# Patient Record
Sex: MALE | Race: WHITE | NOT HISPANIC OR LATINO
[De-identification: names, ages, dates, MRNs, and addresses within clinical notes are randomized per-mention and may not be internally consistent; named-entity substitution may affect disease eponyms.]

---

## 2021-05-27 PROBLEM — Z00.00 ENCOUNTER FOR PREVENTIVE HEALTH EXAMINATION: Status: ACTIVE | Noted: 2021-05-27

## 2021-06-02 PROBLEM — I10 BENIGN HYPERTENSION: Status: ACTIVE | Noted: 2021-06-02

## 2021-06-02 PROBLEM — Z87.891 FORMER SMOKER: Status: ACTIVE | Noted: 2021-06-02

## 2021-06-02 PROBLEM — Z86.79 HISTORY OF AORTIC VALVE STENOSIS: Status: RESOLVED | Noted: 2021-06-02 | Resolved: 2021-06-02

## 2021-06-02 PROBLEM — Z82.49 FAMILY HISTORY OF CARDIAC DISORDER: Status: ACTIVE | Noted: 2021-06-02

## 2021-06-02 PROBLEM — Z78.9 SOCIAL ALCOHOL USE: Status: ACTIVE | Noted: 2021-06-02

## 2021-06-02 PROBLEM — Z87.74 HISTORY OF BICUSPID AORTIC VALVE: Status: RESOLVED | Noted: 2021-06-02 | Resolved: 2021-06-02

## 2021-06-03 ENCOUNTER — APPOINTMENT (OUTPATIENT)
Dept: CARDIOTHORACIC SURGERY | Facility: CLINIC | Age: 71
End: 2021-06-03
Payer: MEDICARE

## 2021-06-03 VITALS
OXYGEN SATURATION: 95 % | RESPIRATION RATE: 16 BRPM | HEIGHT: 71 IN | WEIGHT: 230 LBS | SYSTOLIC BLOOD PRESSURE: 109 MMHG | BODY MASS INDEX: 32.2 KG/M2 | HEART RATE: 61 BPM | TEMPERATURE: 97.6 F | DIASTOLIC BLOOD PRESSURE: 71 MMHG

## 2021-06-03 DIAGNOSIS — Z87.74 PERSONAL HISTORY OF (CORRECTED) CONGENITAL MALFORMATIONS OF HEART AND CIRCULATORY SYSTEM: ICD-10-CM

## 2021-06-03 DIAGNOSIS — Z87.891 PERSONAL HISTORY OF NICOTINE DEPENDENCE: ICD-10-CM

## 2021-06-03 DIAGNOSIS — Z82.49 FAMILY HISTORY OF ISCHEMIC HEART DISEASE AND OTHER DISEASES OF THE CIRCULATORY SYSTEM: ICD-10-CM

## 2021-06-03 DIAGNOSIS — Z78.9 OTHER SPECIFIED HEALTH STATUS: ICD-10-CM

## 2021-06-03 DIAGNOSIS — I10 ESSENTIAL (PRIMARY) HYPERTENSION: ICD-10-CM

## 2021-06-03 DIAGNOSIS — Z86.79 PERSONAL HISTORY OF OTHER DISEASES OF THE CIRCULATORY SYSTEM: ICD-10-CM

## 2021-06-03 PROCEDURE — 99203 OFFICE O/P NEW LOW 30 MIN: CPT

## 2021-06-03 RX ORDER — ASPIRIN 325 MG/1
325 TABLET, FILM COATED ORAL DAILY
Qty: 30 | Refills: 0 | Status: COMPLETED | COMMUNITY
End: 2021-06-03

## 2021-06-14 NOTE — END OF VISIT
[FreeTextEntry3] : \par I personally scribed for YUNG MAYBERRY on Ihsan  3 2021  1:30PM . \par \par \par \par \par Physician Attestation:\par Documented by JOSE PEDERSEN acting as a scribe for YUNG MAYBERRY 06/03/2021 . \par                 All medical record entries made by the Scribe were at my, YUNG MAYBERRY , direction and personally dictated by me on 06/03/2021 . I have reviewed the chart and agree that the record accurately reflects my personal performance of the history, physical exam, assessment and plan\par \par

## 2021-06-14 NOTE — ASSESSMENT
[FreeTextEntry1] : This is a 70 year old male with past medical PAF ( Eliquis) S/P Cardioversion 2 weeks ago but failed ,Hypertension, Aortic stenosis, Bicuspid aortic valve S/P Bio AVR  using 27 mm bovine pericardial aortic valve on 8/2009 with  in [facility] , SSS s/p PPM in 2009 and  revision 2 yrs ago with complaints of palpitations, Dyspnea on exertion, Fatigue for 3 weeks . He reports that he doesn't have much shortness of breath but more fatigue with activities. He went for pacemaker interrogation 3 weeks ago and was found to have 75 % AFib burden, started on Eliquis  and had cardioversion 2 weeks ago but failed. He had a ALYSON on 5/26/21 during the cardioversion which revealed LVEF 55 to 60%, A bio prosthetic aortic valve is present in the aortic position. Severe bio prosthetic aortic stenosis with a PGR 94 mm hg and MGR 64 mm hg, mild aortic regurgitation. Moderate to severe Mitral regurgitation, directed centrally. Mild tricuspid regurgitation. He also reports that he still has hoarseness of voice from ALYSON. He can walk up to 1 mile and 2 FOS without shortness of breath. He uses 1 pillow to sleep. He is here for initial evaluation and management for Bio prosthetic aortic stenosis and Mitral regurgitation. Denies any chest pain, dizziness, syncope or pedal edema. \par \par \par   reviewed the cardiac imaging, medical records and reports with patient and discussed the case. 5/26/21 ALYSON revealed LVEF 55 to 60%, A bio prosthetic aortic valve is present in the aortic position. Severe bio prosthetic aortic stenosis with a PGR 94 mm hg and MGR 64 mm hg, mild aortic regurgitation. Moderate to severe Mitral regurgitation, directed centrally. Mild tricuspid regurgitation. \par \par   discussed the risks , benefits and alternatives to surgery. Risks included but not limited to  bleeding , stroke, Myocardial Infarction, kidney problems,Blood transfusion ,permanent  pacemaker implantation,  infections and death.   quoted a low operative mortality and complication risks.   also discussed the various approaches in detail ( TAVR Vs AVR) . will make final recommendations after discussing with structural heart team. All questions and concerns were addressed and patient agrees to proceed with the plan.\par \par \par Plan:\par 1) TAVR Vs AVR \par 2) Cardiac Catherization to evaluate coronary arteries\par 3) Stop Eliquis 3 days before scheduled surgery date.\par \par ADDENDUM--Discussed with Dr. Workman.  Will have valve in valve TAVR evaluation.\par

## 2021-06-14 NOTE — DATA REVIEWED
[FreeTextEntry1] : 5/26/21 ALYSON revealed LVEF 55 to 60%, A bio prosthetic aortic valve is present in the aortic position. Severe bio prosthetic aortic stenosis with a PGR 94 mm hg and MGR 64 mm hg, mild aortic regurgitation. Moderate to severe Mitral regurgitation, directed centrally. Mild tricuspid regurgitation.

## 2021-06-14 NOTE — HISTORY OF PRESENT ILLNESS
[FreeTextEntry1] : This is a 70 year old male with past medical PAF ( Eliquis) S/P Cardioversion 2 weeks ago but failed ,Hypertension, Aortic stenosis, Bicuspid aortic valve S/P Bio AVR  using 27 mm bovine pericardial aortic valve on 8/2009 with  in [facility] , SSS s/p PPM in 2009 and  revision 2 yrs ago with complaints of palpitations, Dyspnea on exertion, Fatigue for 3 weeks . He reports that he doesn't have much shortness of breath but more fatigue with activities. He went for pacemaker interrogation 3 weeks ago and was found to have 75 % AFib burden, started on Eliquis  and had cardioversion 2 weeks ago but failed. He had a ALYSON on 5/26/21 during the cardioversion which revealed LVEF 55 to 60%, A bio prosthetic aortic valve is present in the aortic position. Severe bio prosthetic aortic stenosis with a PGR 94 mm hg and MGR 64 mm hg, mild aortic regurgitation. Moderate to severe Mitral regurgitation, directed centrally. Mild tricuspid regurgitation. He also reports that he still has hoarseness of voice from ALYSON. He can walk up to 1 mile and 2 FOS without shortness of breath. He uses 1 pillow to sleep. He is here for initial evaluation and management for Bio prosthetic aortic stenosis and Mitral regurgitation. Denies any chest pain, dizziness, syncope or pedal edema.

## 2021-06-14 NOTE — CONSULT LETTER
[Dear  ___] : Dear  [unfilled], [FreeTextEntry2] :   Jose Alberto Workman M.D.\par [address] [place] [address]\par [place] [id number] [FreeTextEntry1] : I had the pleasure of seeing your patient, SADE JAMES,in my office today. \par \par We take a multidisciplinary team approach to patient care and consider you, the referring physician, an extension of our team. We will maintain an open line of communication with you throughout your patient's treatment course. \par \par He  is being evaluated for  Bio prosthetic aortic stenosis and Mitral regurgitation . I have reviewed all of the patient's medical records and diagnostic images at the time of his  office consultation. I have enclosed a copy for your records. \par \par 5/26/21 ALYSON revealed LVEF 55 to 60%, A bio prosthetic aortic valve is present in the aortic position. Severe bio prosthetic aortic stenosis with a PGR 94 mm hg and MGR 64 mm hg, mild aortic regurgitation. Moderate to severe Mitral regurgitation, directed centrally. Mild tricuspid regurgitation. \par \par I have reviewed the indications for surgery and anatomy of the heart. The patient meets criteria for surgery. I have discussed TAVR vs AVR . I will make final recommendations after the discussing with structural heart team. \par \par I appreciate the opportunity to care for your patient at the  [facility] based at [place]. If there are any questions or concerns, please call me  at [phone].\par \par Please see my note below. \par \par Sincerely, \par \par \par \par \par Jaron Orlando MD\par Director\par [facility]\par Professor \par Cardiovascular & Thoracic Surgery\par [facility] [employer].\par \par \par [facility]:\par Department of Cardiovascular and Thoracic Surgery\[address], [address]\par Office: [phone]\par Fax: [phone]\par \par [facility]\par  \par Department of Cardiovascular and Thoracic Surgery\[address], [address]\par Phone: [phone]\par Office: [phone]\par \par \par \par \par \par

## 2021-06-14 NOTE — PHYSICAL EXAM
[General Appearance - Alert] : alert [General Appearance - In No Acute Distress] : in no acute distress [General Appearance - Well Nourished] : well nourished [General Appearance - Well Developed] : well developed [Sclera] : the sclera and conjunctiva were normal [PERRL With Normal Accommodation] : pupils were equal in size, round, and reactive to light [Outer Ear] : the ears and nose were normal in appearance [Hearing Threshold Finger Rub Not Baca] : hearing was normal [Both Tympanic Membranes Were Examined] : both tympanic membranes were normal [Neck Appearance] : the appearance of the neck was normal [] : no respiratory distress [Respiration, Rhythm And Depth] : normal respiratory rhythm and effort [Apical Impulse] : the apical impulse was normal [Heart Rate And Rhythm] : heart rate was normal and rhythm regular [Systolic grade ___/6] : A grade [unfilled]/6 systolic murmur was heard. [Examination Of The Chest] : the chest was normal in appearance [2+] : left 2+ [Breast Appearance] : normal in appearance [Bowel Sounds] : normal bowel sounds [Abdomen Soft] : soft [No CVA Tenderness] : no ~M costovertebral angle tenderness [Abnormal Walk] : normal gait [Involuntary Movements] : no involuntary movements were seen [Skin Color & Pigmentation] : normal skin color and pigmentation [Skin Turgor] : normal skin turgor [No Focal Deficits] : no focal deficits [Oriented To Time, Place, And Person] : oriented to person, place, and time [Impaired Insight] : insight and judgment were intact [Affect] : the affect was normal [Mood] : the mood was normal [Memory Recent] : recent memory was not impaired [Memory Remote] : remote memory was not impaired [FreeTextEntry1] : Deferred

## 2021-06-14 NOTE — REVIEW OF SYSTEMS
[Feeling Tired] : feeling tired [Palpitations] : palpitations [SOB on Exertion] : shortness of breath during exertion [Negative] : Heme/Lymph [Chest Pain] : no chest pain [Lower Ext Edema] : no extremity edema [Dizziness] : no dizziness [Fainting] : no fainting [Easy Bleeding] : no tendency for easy bleeding [Easy Bruising] : no tendency for easy bruising

## 2021-06-14 NOTE — REASON FOR VISIT
[Initial Evaluation] : an initial evaluation [FreeTextEntry1] :  Bio prosthetic aortic stenosis and Mitral regurgitation.

## 2021-06-22 ENCOUNTER — APPOINTMENT (OUTPATIENT)
Dept: CARDIOTHORACIC SURGERY | Facility: CLINIC | Age: 71
End: 2021-06-22
Payer: MEDICARE

## 2021-06-22 VITALS
SYSTOLIC BLOOD PRESSURE: 109 MMHG | HEART RATE: 66 BPM | BODY MASS INDEX: 31.92 KG/M2 | RESPIRATION RATE: 16 BRPM | HEIGHT: 71 IN | DIASTOLIC BLOOD PRESSURE: 74 MMHG | WEIGHT: 228 LBS | TEMPERATURE: 97.6 F | OXYGEN SATURATION: 96 %

## 2021-06-22 DIAGNOSIS — Z95.2 PRESENCE OF PROSTHETIC HEART VALVE: ICD-10-CM

## 2021-06-22 DIAGNOSIS — T82.857A STENOSIS OF OTHER CARDIAC PROSTHETIC, INITIAL ENCOUNTER: ICD-10-CM

## 2021-06-22 DIAGNOSIS — I34.0 NONRHEUMATIC MITRAL (VALVE) INSUFFICIENCY: ICD-10-CM

## 2021-06-22 DIAGNOSIS — Z63.5 DISRUPTION OF FAMILY BY SEPARATION AND DIVORCE: ICD-10-CM

## 2021-06-22 LAB
BASOPHILS # BLD AUTO: 0.07 K/UL
BASOPHILS NFR BLD AUTO: 0.5 %
EOSINOPHIL # BLD AUTO: 0.29 K/UL
EOSINOPHIL NFR BLD AUTO: 2.2 %
HCT VFR BLD CALC: 45.4 %
HGB BLD-MCNC: 14.7 G/DL
IMM GRANULOCYTES NFR BLD AUTO: 0.2 %
LYMPHOCYTES # BLD AUTO: 1.98 K/UL
LYMPHOCYTES NFR BLD AUTO: 15.2 %
MAN DIFF?: NORMAL
MCHC RBC-ENTMCNC: 29.6 PG
MCHC RBC-ENTMCNC: 32.4 GM/DL
MCV RBC AUTO: 91.3 FL
MONOCYTES # BLD AUTO: 1.12 K/UL
MONOCYTES NFR BLD AUTO: 8.6 %
NEUTROPHILS # BLD AUTO: 9.56 K/UL
NEUTROPHILS NFR BLD AUTO: 73.3 %
PLATELET # BLD AUTO: 228 K/UL
RBC # BLD: 4.97 M/UL
RBC # FLD: 14.2 %
WBC # FLD AUTO: 13.05 K/UL

## 2021-06-22 PROCEDURE — 93000 ELECTROCARDIOGRAM COMPLETE: CPT

## 2021-06-22 PROCEDURE — 99205 OFFICE O/P NEW HI 60 MIN: CPT

## 2021-06-22 SDOH — SOCIAL STABILITY - SOCIAL INSECURITY: DISRUPTION OF FAMILY BY SEPARATION AND DIVORCE: Z63.5

## 2021-06-23 LAB
ALBUMIN SERPL ELPH-MCNC: 4.6 G/DL
ALP BLD-CCNC: 110 U/L
ALT SERPL-CCNC: 21 U/L
ANION GAP SERPL CALC-SCNC: 17 MMOL/L
AST SERPL-CCNC: 23 U/L
BILIRUB SERPL-MCNC: 1.6 MG/DL
BUN SERPL-MCNC: 27 MG/DL
CALCIUM SERPL-MCNC: 10 MG/DL
CHLORIDE SERPL-SCNC: 100 MMOL/L
CO2 SERPL-SCNC: 20 MMOL/L
CREAT SERPL-MCNC: 1.16 MG/DL
GLUCOSE SERPL-MCNC: 118 MG/DL
NT-PROBNP SERPL-MCNC: 2055 PG/ML
POTASSIUM SERPL-SCNC: 4.8 MMOL/L
PROT SERPL-MCNC: 7.3 G/DL
SODIUM SERPL-SCNC: 137 MMOL/L

## 2021-06-24 ENCOUNTER — RESULT REVIEW (OUTPATIENT)
Age: 71
End: 2021-06-24

## 2021-06-24 ENCOUNTER — OUTPATIENT (OUTPATIENT)
Dept: OUTPATIENT SERVICES | Facility: HOSPITAL | Age: 71
LOS: 1 days | End: 2021-06-24
Payer: MEDICARE

## 2021-06-24 ENCOUNTER — APPOINTMENT (OUTPATIENT)
Dept: CARDIOLOGY | Facility: HOSPITAL | Age: 71
End: 2021-06-24

## 2021-06-24 VITALS
HEART RATE: 58 BPM | HEIGHT: 71 IN | SYSTOLIC BLOOD PRESSURE: 100 MMHG | WEIGHT: 233.91 LBS | OXYGEN SATURATION: 95 % | TEMPERATURE: 98 F | RESPIRATION RATE: 17 BRPM | DIASTOLIC BLOOD PRESSURE: 66 MMHG

## 2021-06-24 DIAGNOSIS — Z29.9 ENCOUNTER FOR PROPHYLACTIC MEASURES, UNSPECIFIED: ICD-10-CM

## 2021-06-24 DIAGNOSIS — Z95.0 PRESENCE OF CARDIAC PACEMAKER: Chronic | ICD-10-CM

## 2021-06-24 DIAGNOSIS — G47.33 OBSTRUCTIVE SLEEP APNEA (ADULT) (PEDIATRIC): ICD-10-CM

## 2021-06-24 DIAGNOSIS — I35.0 NONRHEUMATIC AORTIC (VALVE) STENOSIS: ICD-10-CM

## 2021-06-24 DIAGNOSIS — Z01.818 ENCOUNTER FOR OTHER PREPROCEDURAL EXAMINATION: ICD-10-CM

## 2021-06-24 DIAGNOSIS — Z98.890 OTHER SPECIFIED POSTPROCEDURAL STATES: Chronic | ICD-10-CM

## 2021-06-24 DIAGNOSIS — Z86.79 PERSONAL HISTORY OF OTHER DISEASES OF THE CIRCULATORY SYSTEM: ICD-10-CM

## 2021-06-24 DIAGNOSIS — Z96.651 PRESENCE OF RIGHT ARTIFICIAL KNEE JOINT: Chronic | ICD-10-CM

## 2021-06-24 LAB
A1C WITH ESTIMATED AVERAGE GLUCOSE RESULT: 5.7 % — HIGH (ref 4–5.6)
ANION GAP SERPL CALC-SCNC: 13 MMOL/L — SIGNIFICANT CHANGE UP (ref 5–17)
BLD GP AB SCN SERPL QL: NEGATIVE — SIGNIFICANT CHANGE UP
BUN SERPL-MCNC: 23 MG/DL — SIGNIFICANT CHANGE UP (ref 7–23)
CALCIUM SERPL-MCNC: 9.9 MG/DL — SIGNIFICANT CHANGE UP (ref 8.4–10.5)
CHLORIDE SERPL-SCNC: 100 MMOL/L — SIGNIFICANT CHANGE UP (ref 96–108)
CO2 SERPL-SCNC: 25 MMOL/L — SIGNIFICANT CHANGE UP (ref 22–31)
CREAT SERPL-MCNC: 1.08 MG/DL — SIGNIFICANT CHANGE UP (ref 0.5–1.3)
ESTIMATED AVERAGE GLUCOSE: 117 MG/DL — HIGH (ref 68–114)
GLUCOSE SERPL-MCNC: 93 MG/DL — SIGNIFICANT CHANGE UP (ref 70–99)
HCT VFR BLD CALC: 43 % — SIGNIFICANT CHANGE UP (ref 39–50)
HGB BLD-MCNC: 14.1 G/DL — SIGNIFICANT CHANGE UP (ref 13–17)
MCHC RBC-ENTMCNC: 29.7 PG — SIGNIFICANT CHANGE UP (ref 27–34)
MCHC RBC-ENTMCNC: 32.8 GM/DL — SIGNIFICANT CHANGE UP (ref 32–36)
MCV RBC AUTO: 90.7 FL — SIGNIFICANT CHANGE UP (ref 80–100)
NRBC # BLD: 0 /100 WBCS — SIGNIFICANT CHANGE UP (ref 0–0)
PLATELET # BLD AUTO: 202 K/UL — SIGNIFICANT CHANGE UP (ref 150–400)
POTASSIUM SERPL-MCNC: 4.5 MMOL/L — SIGNIFICANT CHANGE UP (ref 3.5–5.3)
POTASSIUM SERPL-SCNC: 4.5 MMOL/L — SIGNIFICANT CHANGE UP (ref 3.5–5.3)
RBC # BLD: 4.74 M/UL — SIGNIFICANT CHANGE UP (ref 4.2–5.8)
RBC # FLD: 14.2 % — SIGNIFICANT CHANGE UP (ref 10.3–14.5)
RH IG SCN BLD-IMP: POSITIVE — SIGNIFICANT CHANGE UP
SODIUM SERPL-SCNC: 138 MMOL/L — SIGNIFICANT CHANGE UP (ref 135–145)
WBC # BLD: 10.7 K/UL — HIGH (ref 3.8–10.5)
WBC # FLD AUTO: 10.7 K/UL — HIGH (ref 3.8–10.5)

## 2021-06-24 PROCEDURE — 93880 EXTRACRANIAL BILAT STUDY: CPT | Mod: 26

## 2021-06-24 PROCEDURE — G0463: CPT

## 2021-06-24 PROCEDURE — 87640 STAPH A DNA AMP PROBE: CPT

## 2021-06-24 PROCEDURE — 86900 BLOOD TYPING SEROLOGIC ABO: CPT

## 2021-06-24 PROCEDURE — 85027 COMPLETE CBC AUTOMATED: CPT

## 2021-06-24 PROCEDURE — 87641 MR-STAPH DNA AMP PROBE: CPT

## 2021-06-24 PROCEDURE — 71046 X-RAY EXAM CHEST 2 VIEWS: CPT | Mod: 26

## 2021-06-24 PROCEDURE — 86901 BLOOD TYPING SEROLOGIC RH(D): CPT

## 2021-06-24 PROCEDURE — 71046 X-RAY EXAM CHEST 2 VIEWS: CPT

## 2021-06-24 PROCEDURE — 93880 EXTRACRANIAL BILAT STUDY: CPT

## 2021-06-24 PROCEDURE — 83036 HEMOGLOBIN GLYCOSYLATED A1C: CPT

## 2021-06-24 PROCEDURE — 75572 CT HRT W/3D IMAGE: CPT | Mod: 26,MH

## 2021-06-24 PROCEDURE — 75572 CT HRT W/3D IMAGE: CPT

## 2021-06-24 PROCEDURE — 86923 COMPATIBILITY TEST ELECTRIC: CPT

## 2021-06-24 PROCEDURE — 86850 RBC ANTIBODY SCREEN: CPT

## 2021-06-24 PROCEDURE — 80048 BASIC METABOLIC PNL TOTAL CA: CPT

## 2021-06-24 RX ORDER — CEFUROXIME AXETIL 250 MG
1500 TABLET ORAL ONCE
Refills: 0 | Status: DISCONTINUED | OUTPATIENT
Start: 2021-07-01 | End: 2021-07-03

## 2021-06-24 RX ORDER — CHLORHEXIDINE GLUCONATE 213 G/1000ML
1 SOLUTION TOPICAL ONCE
Refills: 0 | Status: DISCONTINUED | OUTPATIENT
Start: 2021-07-01 | End: 2021-07-03

## 2021-06-24 NOTE — H&P PST ADULT - HISTORY OF PRESENT ILLNESS
69 y/o male with history of bicuspid aortic valve, who underwent an AVR and ascending aortic aneurysm repair by Dr. Orlando in 2009.  He was evaluated by Dr. Orlando and recommended to see Dr. Carranza for evaluation for TAVR.  PMHx includes AFib (s/p recent unsuccessful cardioversion on 5/26 at [facility], remains in AFib, started on Eliquis).  ALYSON done.  Angio scheduled to be done at the time of the TAVR.  He complains of ..............Denies angina, orthopnea,     He is scheduled for TAVR on 7/1/21.     He was fully vaccinated for Covid-19, copy of vaccination card on PST chart.   71 y/o male with history of bicuspid aortic valve, who underwent an AVR and ascending aortic aneurysm repair by Dr. Orlando in 2009.  He was evaluated by Dr. Orlando and recommended to see Dr. Carranza for evaluation for TAVR.  PMHx includes AFib (s/p recent unsuccessful cardioversion on 5/26 at [facility], remains in AFib, started on Eliquis).  ALYSON done.  Angio scheduled to be done at the time of the TAVR.  He complains of worsening fatigue, dyspnea with exertion and palpitations over the past two months and more recently developed dyspnea at night requiring him to sleep in his recliner.  Denies fever, chills, chest pain, wheezing, cough.  He is scheduled for TAVR on 7/1/21.     He was fully vaccinated for Covid-19, copy of vaccination card on PST chart.

## 2021-06-24 NOTE — H&P PST ADULT - PRIMARY CARE PROVIDER
Yue Workman([facility]) Biju- Dr. Workman([facility]) [phone]

## 2021-06-24 NOTE — H&P PST ADULT - ASSESSMENT
WALTI VTE 2.0 SCORE [CLOT updated 2019]    AGE RELATED RISK FACTORS                                                       MOBILITY RELATED FACTORS  [ ] Age 41-60 years                                            (1 Point)                    [ ] Bed rest                                                        (1 Point)  [ x] Age: 61-74 years                                           (2 Points)                  [ ] Plaster cast                                                   (2 Points)  [ ] Age= 75 years                                              (3 Points)                    [ ] Bed bound for more than 72 hours                 (2 Points)    DISEASE RELATED RISK FACTORS                                               GENDER SPECIFIC FACTORS  [ ] Edema in the lower extremities                       (1 Point)              [ ] Pregnancy                                                     (1 Point)  [ ] Varicose veins                                               (1 Point)                     [ ] Post-partum < 6 weeks                                   (1 Point)             [x ] BMI > 25 Kg/m2                                            (1 Point)                     [ ] Hormonal therapy  or oral contraception          (1 Point)                 [ ] Sepsis (in the previous month)                        (1 Point)               [ ] History of pregnancy complications                 (1 point)  [ ] Pneumonia or serious lung disease                                               [ ] Unexplained or recurrent                     (1 Point)           (in the previous month)                               (1 Point)  [ ] Abnormal pulmonary function test                     (1 Point)                 SURGERY RELATED RISK FACTORS  [ ] Acute myocardial infarction                              (1 Point)               [ ]  Section                                             (1 Point)  [ ] Congestive heart failure (in the previous month)  (1 Point)      [ ] Minor surgery                                                  (1 Point)   [ ] Inflammatory bowel disease                             (1 Point)               [ ] Arthroscopic surgery                                        (2 Points)  [ ] Central venous access                                      (2 Points)                [x ] General surgery lasting more than 45 minutes (2 points)  [ ] Malignancy- Present or previous                   (2 Points)                [ ] Elective arthroplasty                                         (5 points)    [ ] Stroke (in the previous month)                          (5 Points)                                                                                                                                                           HEMATOLOGY RELATED FACTORS                                                 TRAUMA RELATED RISK FACTORS  [ ] Prior episodes of VTE                                     (3 Points)                [ ] Fracture of the hip, pelvis, or leg                       (5 Points)  [ ] Positive family history for VTE                         (3 Points)             [ ] Acute spinal cord injury (in the previous month)  (5 Points)  [ ] Prothrombin 62678 A                                     (3 Points)               [ ] Paralysis  (less than 1 month)                             (5 Points)  [ ] Factor V Leiden                                             (3 Points)                  [ ] Multiple Trauma within 1 month                        (5 Points)  [ ] Lupus anticoagulants                                     (3 Points)                                                           [ ] Anticardiolipin antibodies                               (3 Points)                                                       [ ] High homocysteine in the blood                      (3 Points)                                             [ ] Other congenital or acquired thrombophilia      (3 Points)                                                [ ] Heparin induced thrombocytopenia                  (3 Points)                                     Total Score [     5     ]

## 2021-06-24 NOTE — H&P PST ADULT - CARDIOVASCULAR SYMPTOMS
We wish you continued good healing. If you have any questions or concerns, please do not hesitate to contact us at [phone]      Please remember to call and schedule a follow up appointment if one was recommended at your earliest convenience.   PODIATRY CLINIC HOURS  TELEPHONE NUMBER    Dr. Efren DENTONPBRUCE [facility]    Clinics:  [facility]        Sabrina Martinez MA  Medical Assistant  Tuesday 1PM-6PM  [place]  Wednesday 7AM-2PM  [place]  Thursday 10AM-6PM  [place] Friday 7AM-345PM  [place]  Specialty schedulers:   [phone] to make an appointment with any Specialty Provider.        Urgent Care locations:    [facility] Monday-Friday 5 pm - 9 pm. Saturday-Sunday 9 am -5pm    Monday-Friday 11 am - 9 pm Saturday 9 am - 5 pm     Monday-Sunday 12 noon-8PM [phone]     If you need a medication refill, please contact us you may need lab work and/or a follow up visit prior to your refill (i.e. Antifungal medications).    [facility] (secure e-mail communication and access to your chart) to send a message or to make an appointment.    If MRI needed please call [place] at [phone]          
paroxysmal nocturnal dyspnea

## 2021-06-24 NOTE — H&P PST ADULT - NSICDXPROBLEM_GEN_ALL_CORE_FT
PROBLEM DIAGNOSES  Problem: H/O aortic valve stenosis  Assessment and Plan: Pt. is scheduled for TAVR on 7/1/21.  Preop instructions reviewed, pt verbalized understanding.   Preop labs drawn today (CBC, BMP, HGBA1C, MRSA, T & S).  Pt. had Carotid duplex and CXR done today.  Pt. instructed to stop Eliquis on 6/28/21 by surgeon.  He has been fully vaccinated for Covid-19, copy of vaccination card on PST Chart.    Problem: Need for prophylactic measure  Assessment and Plan: The Caprini score indicates this patient is at risk for a VTE event (score 3-5).  Most surgical patients in this group would benefit from pharmacologic prophylaxis.  The surgical team will determine the balance between VTE risk and bleeding risk      Problem: SONJA (obstructive sleep apnea)  Assessment and Plan: OR booking notified for SONJA precautions.

## 2021-06-24 NOTE — H&P PST ADULT - NSICDXPASTMEDICALHX_GEN_ALL_CORE_FT
PAST MEDICAL HISTORY:  Afib     History of BPH     Hypertension     Mitral insufficiency     S/P release of urethral stricture due to kidney stone which traveled to urethra s/p lithotripsy 2/2020

## 2021-06-24 NOTE — H&P PST ADULT - NSICDXPASTSURGICALHX_GEN_ALL_CORE_FT
PAST SURGICAL HISTORY:  H/O aortic valve repair 2009    H/O lithotripsy 2/2020    History of cardioversion 5/26/21 [facility] by Dr. Workman    Pacemaker 2009, 2017    Status post right knee replacement 2015

## 2021-06-25 LAB
MRSA PCR RESULT.: SIGNIFICANT CHANGE UP
S AUREUS DNA NOSE QL NAA+PROBE: DETECTED

## 2021-06-30 DIAGNOSIS — Z22.321 CARRIER OR SUSPECTED CARRIER OF METHICILLIN SUSCEPTIBLE STAPHYLOCOCCUS AUREUS: ICD-10-CM

## 2021-07-01 ENCOUNTER — APPOINTMENT (OUTPATIENT)
Dept: CARDIOTHORACIC SURGERY | Facility: HOSPITAL | Age: 71
End: 2021-07-01
Payer: MEDICARE

## 2021-07-01 ENCOUNTER — INPATIENT (INPATIENT)
Facility: HOSPITAL | Age: 71
LOS: 1 days | Discharge: HOME | DRG: 267 | End: 2021-07-03
Attending: INTERNAL MEDICINE | Admitting: INTERNAL MEDICINE
Payer: MEDICARE

## 2021-07-01 VITALS
DIASTOLIC BLOOD PRESSURE: 92 MMHG | WEIGHT: 233.91 LBS | OXYGEN SATURATION: 96 % | SYSTOLIC BLOOD PRESSURE: 144 MMHG | HEART RATE: 76 BPM | HEIGHT: 71 IN | TEMPERATURE: 98 F | RESPIRATION RATE: 20 BRPM

## 2021-07-01 DIAGNOSIS — Z98.890 OTHER SPECIFIED POSTPROCEDURAL STATES: Chronic | ICD-10-CM

## 2021-07-01 DIAGNOSIS — Z87.438 PERSONAL HISTORY OF OTHER DISEASES OF MALE GENITAL ORGANS: ICD-10-CM

## 2021-07-01 DIAGNOSIS — I35.0 NONRHEUMATIC AORTIC (VALVE) STENOSIS: ICD-10-CM

## 2021-07-01 DIAGNOSIS — I48.91 UNSPECIFIED ATRIAL FIBRILLATION: ICD-10-CM

## 2021-07-01 DIAGNOSIS — I10 ESSENTIAL (PRIMARY) HYPERTENSION: ICD-10-CM

## 2021-07-01 DIAGNOSIS — Z96.651 PRESENCE OF RIGHT ARTIFICIAL KNEE JOINT: Chronic | ICD-10-CM

## 2021-07-01 DIAGNOSIS — Z95.0 PRESENCE OF CARDIAC PACEMAKER: Chronic | ICD-10-CM

## 2021-07-01 DIAGNOSIS — Z95.2 PRESENCE OF PROSTHETIC HEART VALVE: ICD-10-CM

## 2021-07-01 PROBLEM — T82.857A STENOSIS OF PROSTHETIC AORTIC VALVE: Status: ACTIVE | Noted: 2021-06-02

## 2021-07-01 PROBLEM — I34.0 MITRAL INSUFFICIENCY: Status: ACTIVE | Noted: 2021-06-02

## 2021-07-01 LAB
ALBUMIN SERPL ELPH-MCNC: 3.7 G/DL — SIGNIFICANT CHANGE UP (ref 3.3–5)
ALP SERPL-CCNC: 87 U/L — SIGNIFICANT CHANGE UP (ref 40–120)
ALT FLD-CCNC: 16 U/L — SIGNIFICANT CHANGE UP (ref 10–45)
ANION GAP SERPL CALC-SCNC: 13 MMOL/L — SIGNIFICANT CHANGE UP (ref 5–17)
APTT BLD: 34 SEC — SIGNIFICANT CHANGE UP (ref 27.5–35.5)
AST SERPL-CCNC: 16 U/L — SIGNIFICANT CHANGE UP (ref 10–40)
BASOPHILS # BLD AUTO: 0.03 K/UL — SIGNIFICANT CHANGE UP (ref 0–0.2)
BASOPHILS NFR BLD AUTO: 0.3 % — SIGNIFICANT CHANGE UP (ref 0–2)
BILIRUB SERPL-MCNC: 1.6 MG/DL — HIGH (ref 0.2–1.2)
BUN SERPL-MCNC: 22 MG/DL — SIGNIFICANT CHANGE UP (ref 7–23)
CALCIUM SERPL-MCNC: 8.4 MG/DL — SIGNIFICANT CHANGE UP (ref 8.4–10.5)
CHLORIDE SERPL-SCNC: 101 MMOL/L — SIGNIFICANT CHANGE UP (ref 96–108)
CO2 SERPL-SCNC: 21 MMOL/L — LOW (ref 22–31)
CREAT SERPL-MCNC: 0.87 MG/DL — SIGNIFICANT CHANGE UP (ref 0.5–1.3)
EOSINOPHIL # BLD AUTO: 0.09 K/UL — SIGNIFICANT CHANGE UP (ref 0–0.5)
EOSINOPHIL NFR BLD AUTO: 1 % — SIGNIFICANT CHANGE UP (ref 0–6)
GLUCOSE BLDC GLUCOMTR-MCNC: 129 MG/DL — HIGH (ref 70–99)
GLUCOSE SERPL-MCNC: 156 MG/DL — HIGH (ref 70–99)
HCT VFR BLD CALC: 38.1 % — LOW (ref 39–50)
HGB BLD-MCNC: 12.5 G/DL — LOW (ref 13–17)
IMM GRANULOCYTES NFR BLD AUTO: 0.6 % — SIGNIFICANT CHANGE UP (ref 0–1.5)
INR BLD: 1.18 RATIO — HIGH (ref 0.88–1.16)
LYMPHOCYTES # BLD AUTO: 0.96 K/UL — LOW (ref 1–3.3)
LYMPHOCYTES # BLD AUTO: 11 % — LOW (ref 13–44)
MCHC RBC-ENTMCNC: 29.1 PG — SIGNIFICANT CHANGE UP (ref 27–34)
MCHC RBC-ENTMCNC: 32.8 GM/DL — SIGNIFICANT CHANGE UP (ref 32–36)
MCV RBC AUTO: 88.8 FL — SIGNIFICANT CHANGE UP (ref 80–100)
MONOCYTES # BLD AUTO: 0.28 K/UL — SIGNIFICANT CHANGE UP (ref 0–0.9)
MONOCYTES NFR BLD AUTO: 3.2 % — SIGNIFICANT CHANGE UP (ref 2–14)
NEUTROPHILS # BLD AUTO: 7.3 K/UL — SIGNIFICANT CHANGE UP (ref 1.8–7.4)
NEUTROPHILS NFR BLD AUTO: 83.9 % — HIGH (ref 43–77)
NRBC # BLD: 0 /100 WBCS — SIGNIFICANT CHANGE UP (ref 0–0)
PLATELET # BLD AUTO: 150 K/UL — SIGNIFICANT CHANGE UP (ref 150–400)
POTASSIUM SERPL-MCNC: 3.4 MMOL/L — LOW (ref 3.5–5.3)
POTASSIUM SERPL-SCNC: 3.4 MMOL/L — LOW (ref 3.5–5.3)
PROT SERPL-MCNC: 6.2 G/DL — SIGNIFICANT CHANGE UP (ref 6–8.3)
PROTHROM AB SERPL-ACNC: 14.1 SEC — HIGH (ref 10.6–13.6)
RBC # BLD: 4.29 M/UL — SIGNIFICANT CHANGE UP (ref 4.2–5.8)
RBC # FLD: 14.1 % — SIGNIFICANT CHANGE UP (ref 10.3–14.5)
SODIUM SERPL-SCNC: 135 MMOL/L — SIGNIFICANT CHANGE UP (ref 135–145)
WBC # BLD: 8.71 K/UL — SIGNIFICANT CHANGE UP (ref 3.8–10.5)
WBC # FLD AUTO: 8.71 K/UL — SIGNIFICANT CHANGE UP (ref 3.8–10.5)

## 2021-07-01 PROCEDURE — 33361 REPLACE AORTIC VALVE PERQ: CPT | Mod: Q0,62

## 2021-07-01 PROCEDURE — 71045 X-RAY EXAM CHEST 1 VIEW: CPT | Mod: 26

## 2021-07-01 PROCEDURE — 93355 ECHO TRANSESOPHAGEAL (TEE): CPT

## 2021-07-01 PROCEDURE — 33361 REPLACE AORTIC VALVE PERQ: CPT | Mod: 62,Q0

## 2021-07-01 PROCEDURE — 93010 ELECTROCARDIOGRAM REPORT: CPT

## 2021-07-01 RX ORDER — AMLODIPINE BESYLATE 2.5 MG/1
5 TABLET ORAL AT BEDTIME
Refills: 0 | Status: DISCONTINUED | OUTPATIENT
Start: 2021-07-01 | End: 2021-07-03

## 2021-07-01 RX ORDER — APIXABAN 2.5 MG/1
5 TABLET, FILM COATED ORAL
Refills: 0 | Status: DISCONTINUED | OUTPATIENT
Start: 2021-07-02 | End: 2021-07-03

## 2021-07-01 RX ORDER — TAMSULOSIN HYDROCHLORIDE 0.4 MG/1
0.4 CAPSULE ORAL AT BEDTIME
Refills: 0 | Status: DISCONTINUED | OUTPATIENT
Start: 2021-07-01 | End: 2021-07-03

## 2021-07-01 RX ORDER — AMLODIPINE BESYLATE 2.5 MG/1
1 TABLET ORAL
Qty: 0 | Refills: 0 | DISCHARGE

## 2021-07-01 RX ORDER — METOPROLOL TARTRATE 50 MG
75 TABLET ORAL
Refills: 0 | Status: DISCONTINUED | OUTPATIENT
Start: 2021-07-01 | End: 2021-07-03

## 2021-07-01 RX ORDER — ASPIRIN/CALCIUM CARB/MAGNESIUM 324 MG
81 TABLET ORAL DAILY
Refills: 0 | Status: DISCONTINUED | OUTPATIENT
Start: 2021-07-01 | End: 2021-07-03

## 2021-07-01 RX ORDER — LIDOCAINE HCL 20 MG/ML
0.2 VIAL (ML) INJECTION ONCE
Refills: 0 | Status: DISCONTINUED | OUTPATIENT
Start: 2021-07-01 | End: 2021-07-01

## 2021-07-01 RX ORDER — APIXABAN 2.5 MG/1
1 TABLET, FILM COATED ORAL
Qty: 0 | Refills: 0 | DISCHARGE

## 2021-07-01 RX ORDER — TAMSULOSIN HYDROCHLORIDE 0.4 MG/1
1 CAPSULE ORAL
Qty: 0 | Refills: 0 | DISCHARGE

## 2021-07-01 RX ORDER — CEFUROXIME AXETIL 250 MG
1500 TABLET ORAL EVERY 8 HOURS
Refills: 0 | Status: COMPLETED | OUTPATIENT
Start: 2021-07-01 | End: 2021-07-01

## 2021-07-01 RX ORDER — SODIUM CHLORIDE 9 MG/ML
3 INJECTION INTRAMUSCULAR; INTRAVENOUS; SUBCUTANEOUS EVERY 8 HOURS
Refills: 0 | Status: DISCONTINUED | OUTPATIENT
Start: 2021-07-01 | End: 2021-07-01

## 2021-07-01 RX ORDER — HYDROCHLOROTHIAZIDE 25 MG
25 TABLET ORAL DAILY
Refills: 0 | Status: DISCONTINUED | OUTPATIENT
Start: 2021-07-02 | End: 2021-07-03

## 2021-07-01 RX ADMIN — TAMSULOSIN HYDROCHLORIDE 0.4 MILLIGRAM(S): 0.4 CAPSULE ORAL at 22:00

## 2021-07-01 RX ADMIN — Medication 100 MILLIGRAM(S): at 22:48

## 2021-07-01 RX ADMIN — Medication 75 MILLIGRAM(S): at 22:00

## 2021-07-01 RX ADMIN — Medication 100 MILLIGRAM(S): at 16:31

## 2021-07-01 RX ADMIN — AMLODIPINE BESYLATE 5 MILLIGRAM(S): 2.5 TABLET ORAL at 22:00

## 2021-07-01 RX ADMIN — Medication 81 MILLIGRAM(S): at 16:31

## 2021-07-01 NOTE — CARDIOLOGY SUMMARY
[de-identified] : 5/26/2021\par ALYSON\par EF 55-60%, mGr 64 mmHg, pGr 94 mmHg, moderate mitral regurgitation  [de-identified] : Last device interrogation 5/18/2021

## 2021-07-01 NOTE — HISTORY OF PRESENT ILLNESS
[FreeTextEntry1] : Mr. Gabriel is a 70 year old male with a history of bicuspid aortic valve, who underwent an AVR (#27 CE) and ascending aortic aneurysm repair with Dr. Orlando in 2009. He was evaluated by Dr. Orlando on 6/3, who asked that I see him for consideration of transcatheter aortic valve in valve. Parveen reports two months of increasing fatigue, exertional dyspnea, and palpitations. PPM interrogation on 5/18 reported a 75% AF burden, he was started on Eliquis, and underwent a cardioversion on 5/26 (unsuccessful and remains in AF). ALYSON at that time demonstrated severe bioprosthetic aortic stenosis with a pGr of 94 mmHg, and mGr of 64 mmHg, and moderate mitral regurgitation. He has no angina, orthopnea, dizziness, or syncope. He has been sleeping in a recliner for the past two weeks since developing PND, "I can fall asleep laying down, but I wake up in the middle of the night now gasping". He has had not repeat an angiogram since his cardiac surgery. He completed his COVID-19 Vaccination series in March.\par \par He felt dyspnea walking today from the  parking in the lobby to the office--as well as with doing laundry recently and going up and down the stairs at home (NYHA II). He has no angina, dizziness, LH, or syncope. He had an episode of feeling off-balance the other day that lasted "about 10 seconds" and he thought it was a visual issue (from watching a big screen TV for too long). Home BP readings at night are 90's/50's, which concerns him. \par \par CV: Dr FABIENNE Workman ([facility])\par \par

## 2021-07-01 NOTE — PROGRESS NOTE ADULT - PROBLEM SELECTOR PLAN 1
s/p TAVR on 7/1/2021   Echo/EKG on 7/2  Resume Eliquis on 7/2 if groins stable s/p TAVR on 7/1/2021   ASA 81mg 6hrs post procedure--ordered  Cefuroxime ppx  Echo/EKG on 7/2  Resume Eliquis on 7/2 if groins stable  Progress diet as tolerated

## 2021-07-01 NOTE — BRIEF OPERATIVE NOTE - NSICDXBRIEFPROCEDURE_GEN_ALL_CORE_FT
PROCEDURES:  TAVR, percutaneous femoral approach, using prosthetic valve 01-Jul-2021 10:33:26 #29 [employer]tronic TAVR, Valve in Valve, R TF approach Augustus Guadarrama N

## 2021-07-01 NOTE — DISCUSSION/SUMMARY
[FreeTextEntry1] : Mr Nery has a degenerated AVR with recent acceleration of symptoms, as noted. I am in agreement with Dr Orlando that he is an appropriate candidate for TAV in SUSANNA consideration. Given his symptoms, and in an effort to expedite his treatment, he is scheduled for a cardiac structural CT this week. I would propose we not do an angiogram prior to his case so as not to delay proceeding--and he had no obstructive CAD at the time of his AVR. We will perform diagnostic angiography at the time of his TAV in SUSANNA--my suspicion of him having obstructive CAD is very low. I have discussed the potential risks and benefits of TAV in SUSANNA with him in detail, including death, stroke (we will evaluate his anatomy for use of the [place] cerebral embolic protection device), PVL, elevated gradients post TAV in SUS[place]NA, and vascular complications. He has a PPM. I have answered all of his questions in detail. Dr Orlando is in agreement with this strategy and will also notify Dr Workman of our impressions and plan.

## 2021-07-01 NOTE — PROGRESS NOTE ADULT - ASSESSMENT
71 y/o male with history of bicuspid aortic valve, who underwent an AVR and ascending aortic aneurysm repair by Dr. Orlando in 2009.  He was evaluated by Dr. Orlando and recommended to see Dr. Carranza for evaluation for TAVR.  PMHx includes AFib PPM/HTN and is now(s/p recent unsuccessful cardioversion on 5/26 at [facility], remains in AFib, started on Eliquis).  ALYSON done.  Angio scheduled to be done at the time of the TAVR.  Had complaints of worsening fatigue, dyspnea with exertion and palpitations over the past two months and more recently developed dyspnea at night requiring him to sleep in his recliner.  Patient is now s/p right femoral approach valve in valve TAVR on 7/1/2021 with Dr. Carranza.  Left femoral TVP DC'd post-procedure. Small amount of dried drainage noted on right groin TAVR site but site is soft, no ecchymosis and bilateral lower extremity pulses are palpable.   69 y/o male with history of bicuspid aortic valve, who underwent an AVR and ascending aortic aneurysm repair by Dr. Orlando in 2009.  He was evaluated by Dr. Orlando and recommended to see Dr. Carranza for evaluation for TAVR.  PMHx includes AFib PPM/HTN and is now(s/p recent unsuccessful cardioversion on 5/26 at [facility], remains in AFib, started on Eliquis).  ALYSON done.  Angio scheduled to be done at the time of the TAVR.  Had complaints of worsening fatigue, dyspnea with exertion and palpitations over the past two months and more recently developed dyspnea at night requiring him to sleep in his recliner.  Patient is now s/p right femoral approach valve in valve TAVR on 7/1/2021 with Dr. Carranza.  Left femoral TVP DC'd post-procedure. Small amount of dried drainage noted on right groin TAVR site but site is soft, no ecchymosis and bilateral lower extremity pulses are palpable.      7/1- Roger TAVR done

## 2021-07-01 NOTE — CHART NOTE - NSCHARTNOTEFT_GEN_A_CORE
HPI:  69 y/o male with history of bicuspid aortic valve, who underwent an AVR and ascending aortic aneurysm repair by Dr. Orlando in 2009.  He was evaluated by Dr. Orlando and recommended to see Dr. Carranza for evaluation for TAVR.  PMHx includes AFib (s/p recent unsuccessful cardioversion on 5/26 at [facility], remains in AFib, started on Eliquis).  ALYSON done.  Angio scheduled to be done at the time of the TAVR.  He complains of worsening fatigue, dyspnea with exertion and palpitations over the past two months and more recently developed dyspnea at night requiring him to sleep in his recliner.  Denies fever, chills, chest pain, wheezing, cough.  He is scheduled for TAVR on 7/1/21.     He was fully vaccinated for Covid-19, copy of vaccination card on [facility] chart.   (24 Jun 2021 11:17)      7/1/21  S/P 29mm valve via RFA.   Right groin: 2 percloses  Left groin: 1 perclose, TVP removed.  ASA 6 hours post procedure, Eliquis (7/2/21) in Am if groins are stable.  Zinacef 8 hours post for 2 more doses.  Resume home meds. HPI:  71 y/o male with history of bicuspid aortic valve, who underwent an AVR and ascending aortic aneurysm repair by Dr. Orlando in 2009.  He was evaluated by Dr. Orlando and recommended to see Dr. Carranza for evaluation for TAVR.  PMHx includes AFib (s/p recent unsuccessful cardioversion on 5/26 at [facility], remains in AFib, started on Eliquis).  ALYSON done.  Angio scheduled to be done at the time of the TAVR.  He complains of worsening fatigue, dyspnea with exertion and palpitations over the past two months and more recently developed dyspnea at night requiring him to sleep in his recliner.  Denies fever, chills, chest pain, wheezing, cough.  He is scheduled for TAVR on 7/1/21.     He was fully vaccinated for Covid-19, copy of vaccination card on PST chart.   (24 Jun 2021 11:17)      7/1/21  S/P 29mm valve via RFA. Vitals stable, sites WNL.  Right groin: 2 percloses  Left groin: 1 perclose, TVP removed.  ASA 6 hours post procedure, Eliquis (7/2/21) in Am if groins are stable.  Zinacef 8 hours post for 2 more doses.  Resume home meds. HPI:  71 y/o male with history of bicuspid aortic valve, who underwent an AVR and ascending aortic aneurysm repair by Dr. Orlando in 2009.  He was evaluated by Dr. Orlando and recommended to see Dr. Carranza for evaluation for TAVR.  PMHx includes AFib (s/p recent unsuccessful cardioversion on 5/26 at [facility], remains in AFib, started on Eliquis).  ALYSON done.  Angio scheduled to be done at the time of the TAVR.  He complains of worsening fatigue, dyspnea with exertion and palpitations over the past two months and more recently developed dyspnea at night requiring him to sleep in his recliner.  Denies fever, chills, chest pain, wheezing, cough.  He is scheduled for TAVR on 7/1/21.     He was fully vaccinated for Covid-19, copy of vaccination card on PST chart.   (24 Jun 2021 11:17)      7/1/21  S/P 29mm valve via RFA. Vitals stable, sites WNL.  Right groin: 2 percloses  Left groin: 1 perclose, TVP removed.  ASA 6 hours post procedure, Eliquis (7/2/21) in AM if groins are stable.  Zinacef 8 hours post for 2 more doses.  Resume home meds.

## 2021-07-01 NOTE — PROGRESS NOTE ADULT - PROBLEM SELECTOR PLAN 2
Resume Eliquis on 7/2 if groins stable Resume Eliquis on 7/2 if groins stable  Toprol 75mg BID starting tonight.

## 2021-07-01 NOTE — PROGRESS NOTE ADULT - SUBJECTIVE AND OBJECTIVE BOX
Subjective: "I feel good ".  Patient denies chest pain, shortness of breath, abdominal pain.    VITAL SIGNS    Vital Signs Last 24 Hrs  T(C): 36.1 (07-01-21 @ 13:25), Max: 36.7 (07-01-21 @ 05:27)  T(F): 97 (07-01-21 @ 13:25), Max: 98.1 (07-01-21 @ 05:27)  HR: 65 (07-01-21 @ 13:25) (59 - 76)  BP: 114/70 (07-01-21 @ 13:25) (95/54 - 144/92)  RR: 18 (07-01-21 @ 13:25) (10 - 22)  SpO2: 94% (07-01-21 @ 13:25) (92% - 96%)             Daily Height in cm: 180.34 (01 Jul 2021 07:20)    Daily   Admit Wt: Drug Dosing Weight  Height (cm): 180.3 (01 Jul 2021 07:20)  Weight (kg): 106.1 (01 Jul 2021 07:20)  BMI (kg/m2): 32.6 (01 Jul 2021 07:20)  BSA (m2): 2.25 (01 Jul 2021 07:20)    Bilirubin Total, Serum: 1.6 mg/dL (07-01 @ 10:55)    CAPILLARY BLOOD GLUCOSE      POCT Blood Glucose.: 129 mg/dL (01 Jul 2021 05:52)            Telemetry:  AFIB    PHYSICAL EXAM    Neurology: alert and oriented x 3, nonfocal, no gross deficits  CV : tele:  RSR  Sternal Wound :  CDI with dressing , Stable  Lungs: clear. RR easy, unlabored   Abdomen: soft, nontender, nondistended, positive bowel sounds, bowel movement   Neg N/V/D   :  pt voiding without difficulty   Extremities:   MATT; edema, neg calf tenderness.   PPP bilaterally      PW:    Chest tubes:        aspirin enteric coated 81 milliGRAM(s) Oral daily  cefuroxime  IVPB 1500 milliGRAM(s) IV Intermittent every 8 hours  cefuroxime  IVPB 1500 milliGRAM(s) IV Intermittent once  chlorhexidine 2% Cloths 1 Application(s) Topical once  tamsulosin 0.4 milliGRAM(s) Oral at bedtime                     Subjective: "I feel good ".  Patient denies chest pain, palpitations, shortness of breath, abdominal pain.    VITAL SIGNS    Vital Signs Last 24 Hrs  T(C): 36.1 (07-01-21 @ 13:25), Max: 36.7 (07-01-21 @ 05:27)  T(F): 97 (07-01-21 @ 13:25), Max: 98.1 (07-01-21 @ 05:27)  HR: 65 (07-01-21 @ 13:25) (59 - 76)  BP: 114/70 (07-01-21 @ 13:25) (95/54 - 144/92)  RR: 18 (07-01-21 @ 13:25) (10 - 22)  SpO2: 94% (07-01-21 @ 13:25) (92% - 96%)             Daily Height in cm: 180.34 (01 Jul 2021 07:20)    Daily   Admit Wt: Drug Dosing Weight  Height (cm): 180.3 (01 Jul 2021 07:20)  Weight (kg): 106.1 (01 Jul 2021 07:20)  BMI (kg/m2): 32.6 (01 Jul 2021 07:20)  BSA (m2): 2.25 (01 Jul 2021 07:20)    Bilirubin Total, Serum: 1.6 mg/dL (07-01 @ 10:55)    CAPILLARY BLOOD GLUCOSE      POCT Blood Glucose.: 129 mg/dL (01 Jul 2021 05:52)            Telemetry:  AFIB    PHYSICAL EXAM    Neurology: alert and oriented x 4, nonfocal, no gross deficits  CV: Irregular irregular tele:AFIB e  Lungs: CTA BL, RR easy, unlabored, no cough no wheeze   Abdomen: soft, nontender, nondistended, positive bowel sounds, last BM 6/30  :  Voids without difficulty   Extremities:  Right groin TAVR site with gauze and tegaderm, small amount of dried drainage noted.  Groin is soft, no ecchymosis.  Left groin with gauze and tegaderm dsg s/p TVP removal.  No drainage noted, no s/s hematoma. MATT; +1  LE edema, neg calf tenderness.   PPP bilaterally            aspirin enteric coated 81 milliGRAM(s) Oral daily  cefuroxime  IVPB 1500 milliGRAM(s) IV Intermittent every 8 hours  cefuroxime  IVPB 1500 milliGRAM(s) IV Intermittent once  chlorhexidine 2% Cloths 1 Application(s) Topical once  tamsulosin 0.4 milliGRAM(s) Oral at bedtime

## 2021-07-01 NOTE — PROGRESS NOTE ADULT - SUBJECTIVE AND OBJECTIVE BOX
This patient has been implanted with a Transcatheter Aortic Valve Implant under the following NCT/IVETT:    TAVR:  Commercial Implant NCT [id number], IVETT N/A and will be placed into the TVT Registry.            HAIM Garcia  [id number]

## 2021-07-01 NOTE — REASON FOR VISIT
[Structural Heart and Valve Disease] : structural heart and valve disease [FreeTextEntry3] : Dr. Workman

## 2021-07-01 NOTE — PRE-ANESTHESIA EVALUATION ADULT - NSANTHPMHFT_GEN_ALL_CORE
71 yo M with pmhx of BPH, HTN, AFib, PPM, Biscupid AV s/p AVR  presenting for TF TAVR. Patient is NPO, PPM interrogated 5/27/21, COVID vaccinated, and denies any CP or SOB. All questions and concerns addressed.

## 2021-07-01 NOTE — PROGRESS NOTE ADULT - PROBLEM SELECTOR PLAN 3
Amlodipine 5mg, patient takes at bedtime Amlodipine 5mg, patient takes at bedtime  Toprol XL 75mg BID

## 2021-07-01 NOTE — PHYSICAL EXAM
[Well Developed] : well developed [Well Nourished] : well nourished [Normal Conjunctiva] : normal conjunctiva [Normal Venous Pressure] : normal venous pressure [Normal Rate] : normal [Rhythm Regular] : regular [III] : a grade 3 [___ +] : [unfilled]U+ pitting edema to L ankle [Clear Lung Fields] : clear lung fields [Good Air Entry] : good air entry [Soft] : abdomen soft [Normal Gait] : normal gait [No Rash] : no rash [Moves all extremities] : moves all extremities [Alert and Oriented] : alert and oriented [Rt] : no varicose veins of the right leg [Lt] : no varicose veins of the left leg [Right Carotid Bruit] : no bruit heard over the right carotid [Left Carotid Bruit] : no bruit heard over the left carotid [de-identified] : BLE edema L>R

## 2021-07-02 ENCOUNTER — TRANSCRIPTION ENCOUNTER (OUTPATIENT)
Age: 71
End: 2021-07-02

## 2021-07-02 LAB
ANION GAP SERPL CALC-SCNC: 11 MMOL/L — SIGNIFICANT CHANGE UP (ref 5–17)
BASOPHILS # BLD AUTO: 0.01 K/UL — SIGNIFICANT CHANGE UP (ref 0–0.2)
BASOPHILS NFR BLD AUTO: 0.1 % — SIGNIFICANT CHANGE UP (ref 0–2)
BUN SERPL-MCNC: 19 MG/DL — SIGNIFICANT CHANGE UP (ref 7–23)
CALCIUM SERPL-MCNC: 9.4 MG/DL — SIGNIFICANT CHANGE UP (ref 8.4–10.5)
CHLORIDE SERPL-SCNC: 101 MMOL/L — SIGNIFICANT CHANGE UP (ref 96–108)
CO2 SERPL-SCNC: 27 MMOL/L — SIGNIFICANT CHANGE UP (ref 22–31)
CREAT SERPL-MCNC: 0.9 MG/DL — SIGNIFICANT CHANGE UP (ref 0.5–1.3)
EOSINOPHIL # BLD AUTO: 0.01 K/UL — SIGNIFICANT CHANGE UP (ref 0–0.5)
EOSINOPHIL NFR BLD AUTO: 0.1 % — SIGNIFICANT CHANGE UP (ref 0–6)
GLUCOSE SERPL-MCNC: 116 MG/DL — HIGH (ref 70–99)
HCT VFR BLD CALC: 39.2 % — SIGNIFICANT CHANGE UP (ref 39–50)
HGB BLD-MCNC: 12.9 G/DL — LOW (ref 13–17)
IMM GRANULOCYTES NFR BLD AUTO: 0.3 % — SIGNIFICANT CHANGE UP (ref 0–1.5)
LYMPHOCYTES # BLD AUTO: 0.92 K/UL — LOW (ref 1–3.3)
LYMPHOCYTES # BLD AUTO: 8.9 % — LOW (ref 13–44)
MCHC RBC-ENTMCNC: 29.7 PG — SIGNIFICANT CHANGE UP (ref 27–34)
MCHC RBC-ENTMCNC: 32.9 GM/DL — SIGNIFICANT CHANGE UP (ref 32–36)
MCV RBC AUTO: 90.3 FL — SIGNIFICANT CHANGE UP (ref 80–100)
MONOCYTES # BLD AUTO: 0.91 K/UL — HIGH (ref 0–0.9)
MONOCYTES NFR BLD AUTO: 8.8 % — SIGNIFICANT CHANGE UP (ref 2–14)
NEUTROPHILS # BLD AUTO: 8.5 K/UL — HIGH (ref 1.8–7.4)
NEUTROPHILS NFR BLD AUTO: 81.8 % — HIGH (ref 43–77)
NRBC # BLD: 0 /100 WBCS — SIGNIFICANT CHANGE UP (ref 0–0)
PLATELET # BLD AUTO: 172 K/UL — SIGNIFICANT CHANGE UP (ref 150–400)
POTASSIUM SERPL-MCNC: 4.4 MMOL/L — SIGNIFICANT CHANGE UP (ref 3.5–5.3)
POTASSIUM SERPL-SCNC: 4.4 MMOL/L — SIGNIFICANT CHANGE UP (ref 3.5–5.3)
RBC # BLD: 4.34 M/UL — SIGNIFICANT CHANGE UP (ref 4.2–5.8)
RBC # FLD: 14 % — SIGNIFICANT CHANGE UP (ref 10.3–14.5)
SODIUM SERPL-SCNC: 139 MMOL/L — SIGNIFICANT CHANGE UP (ref 135–145)
WBC # BLD: 10.38 K/UL — SIGNIFICANT CHANGE UP (ref 3.8–10.5)
WBC # FLD AUTO: 10.38 K/UL — SIGNIFICANT CHANGE UP (ref 3.8–10.5)

## 2021-07-02 PROCEDURE — 93306 TTE W/DOPPLER COMPLETE: CPT | Mod: 26

## 2021-07-02 PROCEDURE — 99232 SBSQ HOSP IP/OBS MODERATE 35: CPT

## 2021-07-02 PROCEDURE — 93010 ELECTROCARDIOGRAM REPORT: CPT

## 2021-07-02 PROCEDURE — 99233 SBSQ HOSP IP/OBS HIGH 50: CPT

## 2021-07-02 RX ADMIN — AMLODIPINE BESYLATE 5 MILLIGRAM(S): 2.5 TABLET ORAL at 21:36

## 2021-07-02 RX ADMIN — TAMSULOSIN HYDROCHLORIDE 0.4 MILLIGRAM(S): 0.4 CAPSULE ORAL at 21:36

## 2021-07-02 RX ADMIN — APIXABAN 5 MILLIGRAM(S): 2.5 TABLET, FILM COATED ORAL at 17:19

## 2021-07-02 RX ADMIN — Medication 75 MILLIGRAM(S): at 17:19

## 2021-07-02 RX ADMIN — APIXABAN 5 MILLIGRAM(S): 2.5 TABLET, FILM COATED ORAL at 06:05

## 2021-07-02 RX ADMIN — Medication 75 MILLIGRAM(S): at 06:05

## 2021-07-02 RX ADMIN — Medication 81 MILLIGRAM(S): at 12:56

## 2021-07-02 NOTE — PROGRESS NOTE ADULT - SUBJECTIVE AND OBJECTIVE BOX
Subjective: "I'm doing well"  Patient denies chest pain, shortness of breath.     VITAL SIGNS    Vital Signs Last 24 Hrs  T(C): 36.5 (21 @ 13:25), Max: 36.6 (21 @ 20:33)  T(F): 97.7 (21 @ 13:25), Max: 97.8 (21 @ 20:33)  HR: 65 (21 @ 13:25) (65 - 73)  BP: 102/68 (21 @ 13:25) (102/68 - 123/69)  RR: 18 (21 @ 13:25) (18 - 18)  SpO2: 96% (21 @ 13:25) (94% - 96%)             @ 07:  -   @ 07:00  --------------------------------------------------------  IN: 480 mL / OUT: 1700 mL / NET: -1220 mL     @ 07:  -   @ 13:50  --------------------------------------------------------  IN: 480 mL / OUT: 0 mL / NET: 480 mL       Daily     Daily Weight in k.2 (2021 10:17)  Admit Wt: Drug Dosing Weight  Height (cm): 180.3 (2021 07:20)  Weight (kg): 106.1 (2021 07:20)  BMI (kg/m2): 32.6 (2021 07:20)  BSA (m2): 2.25 (2021 07:20)      CAPILLARY BLOOD GLUCOSE                Telemetry:      PHYSICAL EXAM    Neurology: alert and oriented x 3, nonfocal, no gross deficits  CV : tele:  RSR  Sternal Wound :  CDI with dressing , Stable  Lungs: clear. RR easy, unlabored   Abdomen: soft, nontender, nondistended, positive bowel sounds, bowel movement   Neg N/V/D   :  pt voiding without difficulty   Extremities:   MATT; edema, neg calf tenderness.   PPP bilaterally          amLODIPine   Tablet 5 milliGRAM(s) Oral at bedtime  apixaban 5 milliGRAM(s) Oral two times a day  aspirin enteric coated 81 milliGRAM(s) Oral daily  cefuroxime  IVPB 1500 milliGRAM(s) IV Intermittent once  chlorhexidine 2% Cloths 1 Application(s) Topical once  hydrochlorothiazide 25 milliGRAM(s) Oral daily  metoprolol succinate ER 75 milliGRAM(s) Oral two times a day  tamsulosin 0.4 milliGRAM(s) Oral at bedtime                     Subjective: "I'm doing well"  Patient denies chest pain, shortness of breath.     VITAL SIGNS    Vital Signs Last 24 Hrs  T(C): 36.5 (21 @ 13:25), Max: 36.6 (21 @ 20:33)  T(F): 97.7 (21 @ 13:25), Max: 97.8 (21 @ 20:33)  HR: 65 (21 @ 13:25) (65 - 73)  BP: 102/68 (21 @ 13:25) (102/68 - 123/69)  RR: 18 (21 @ 13:25) (18 - 18)  SpO2: 96% (21 @ 13:25) (94% - 96%)             @ 07:  -   @ 07:00  --------------------------------------------------------  IN: 480 mL / OUT: 1700 mL / NET: -1220 mL     @ 07:  -   @ 13:50  --------------------------------------------------------  IN: 480 mL / OUT: 0 mL / NET: 480 mL       Daily     Daily Weight in k.2 (2021 10:17)  Admit Wt: Drug Dosing Weight  Height (cm): 180.3 (2021 07:20)  Weight (kg): 106.1 (2021 07:20)  BMI (kg/m2): 32.6 (2021 07:20)  BSA (m2): 2.25 (2021 07:20)      CAPILLARY BLOOD GLUCOSE    Telemetry:  Afib 60's    PHYSICAL EXAM    Neurology: alert and oriented x 3, nonfocal, no gross deficits  CV:Irreg/Irreg tele: AFIB  Lungs: Clear to ascultation bilaterally no cough no wheeze   Abdomen: soft, nontender, nondistended, positive bowel sounds  :  pt voiding without difficulty   Extremities:   MATT; BL groin sites c/d/i.  No s/s hematoma/bleeding.  No LE edema, neg calf tenderness.   PPP bilaterally        amLODIPine   Tablet 5 milliGRAM(s) Oral at bedtime  apixaban 5 milliGRAM(s) Oral two times a day  aspirin enteric coated 81 milliGRAM(s) Oral daily  cefuroxime  IVPB 1500 milliGRAM(s) IV Intermittent once  chlorhexidine 2% Cloths 1 Application(s) Topical once  hydrochlorothiazide 25 milliGRAM(s) Oral daily  metoprolol succinate ER 75 milliGRAM(s) Oral two times a day  tamsulosin 0.4 milliGRAM(s) Oral at bedtime

## 2021-07-02 NOTE — PROGRESS NOTE ADULT - ATTENDING COMMENTS
No acute events reported overnight.  The patient reports that he is clinically doing very well.  He is happy with his progress.  Denies any chest pain/tightness, reviewed, chills, sweats, lightheaded sensation, dizziness or palpitations.  Telemetry demonstrates A. fib/V paced with rates in the 60s to 90s with PVCs and couplets.  Agree with physical examination as noted above.    TTE was personally reviewed with device being in appropriate position with no pericardial effusion.  Continue apixaban 5 mg twice a day and aspirin 81 mg daily.  Monitor for signs or symptoms of bleeding.    Continue home medication hydrochlorothiazide 25 mg daily, 1.5 mg daily, metoprolol succinate 75 mg twice a day and tamsulosin.    Continue telemetry monitoring and daily EKG.    If the patient continues to clinically do well plan for discharge on 7/3/2021.    All questions and concerns of the patient were addressed.

## 2021-07-02 NOTE — PROGRESS NOTE ADULT - SUBJECTIVE AND OBJECTIVE BOX
*****Structural Heart Team*****    Subjective:    The patient is resting comfortably in a chair, stating he feels better than he has in a while. He ambulated around the unit today without any symptoms. There were no events overnight.      PAST MEDICAL & SURGICAL HISTORY:  Afib    Hypertension    Mitral insufficiency    History of BPH    S/P release of urethral stricture  due to kidney stone which traveled to urethra s/p lithotripsy 2/2020    H/O aortic valve repair  2009    History of cardioversion  5/26/21 [facility] by Dr. Workman    Pacemaker  2009, 2017    Status post right knee replacement  2015    H/O lithotripsy  2/2020          T(C): 36.4 (07-02-21 @ 04:35), Max: 36.6 (07-01-21 @ 20:33)  HR: 65 (07-02-21 @ 04:35) (65 - 73)  BP: 123/69 (07-02-21 @ 04:35) (111/53 - 123/69)  RR: 18 (07-02-21 @ 04:35) (18 - 18)  SpO2: 94% (07-02-21 @ 04:35) (94% - 96%)  Wt(kg): --  07-01 @ 07:01  -  07-02 @ 07:00  --------------------------------------------------------  IN: 480 mL / OUT: 1700 mL / NET: -1220 mL    07-02 @ 07:01  -  07-02 @ 13:21  --------------------------------------------------------  IN: 240 mL / OUT: 0 mL / NET: 240 mL      MEDICATIONS  (STANDING):  amLODIPine   Tablet 5 milliGRAM(s) Oral at bedtime  apixaban 5 milliGRAM(s) Oral two times a day  aspirin enteric coated 81 milliGRAM(s) Oral daily  cefuroxime  IVPB 1500 milliGRAM(s) IV Intermittent once  hydrochlorothiazide 25 milliGRAM(s) Oral daily  metoprolol succinate ER 75 milliGRAM(s) Oral two times a day  tamsulosin 0.4 milliGRAM(s) Oral at bedtime    MEDICATIONS  (PRN):      Review of Symptoms:  Constitutional: Awake, Alert, Follows commands  Respiratory: Currently denies  Cardiac: Denies CP, Denies Palpitations  Gastrointestinal: Denies Pain, Denies N/V, tolerating po intake  Vascular: Negative  Extremities: No Edema, No joint pain or swelling  Neurological: Negative  Endocrine: No heat or cold intolerance, No excessive thirst  Heme/Onc: Negative    Exam:  General: A/Ox3, JENNY, NAD  HEENT: Supple, No JVD, Trachea midline, no masses  Pulmonary: CTAB, = Chest Excursion, no accessory muscle use  Cor: No Murmur or rub, S1S2, Irregular  ECG: AFib  Groin/Wound: B/L soft, NT, No hematoma or ecchymosis  Gastrointestinal: Soft, NT/ND, + Bowel Sounds  Neuro: = motor and sensory B/L, No focal deficits  Vascular: 1+ Pulses B/L, No Edema  Extremities: No joint pain or swelling  Skin: Warm/Dry/Normal color, Normal turgor, no rashes                          12.9   10.38 )-----------( 172      ( 02 Jul 2021 06:41 )             39.2   07-02    139  |  101  |  19  ----------------------------<  116<H>  4.4   |  27  |  0.90    Ca    9.4      02 Jul 2021 06:41    TPro  6.2  /  Alb  3.7  /  TBili  1.6<H>  /  DBili  x   /  AST  16  /  ALT  16  /  AlkPhos  87  07-01  PT/INR - ( 01 Jul 2021 10:55 )   PT: 14.1 sec;   INR: 1.18 ratio         PTT - ( 01 Jul 2021 10:55 )  PTT:34.0 sec    Imaging Reviewed:    Post Op TTE: Pending      Assesment/Plan:  70 Male, S/P TAVR for severe Aortic Stenosis, Essential HTN  1.) S/P TAVR: ASA 81 mg po daily, Continue to Monitor Groins. Ambulate as tolerated. Will follow up Post op TTE  2.) Essential HTN: Patient restarted on Amlodipine. Should restart Irbesartan tomorrow.  3.) Chronic Diastolic HF: No indication for diuresis. Continue with Metoprolol.  4.) Ambulate as tolerated  5.) Discharge Plan: Patient is to follow up with Dr. Varela  in 1 week post discharge. HE should then follow up with the [facility]  in 30 days, with a repeat Transthoracic Echo to be done at that visit.    HAIM Tripp  [id number]

## 2021-07-02 NOTE — DISCHARGE NOTE NURSING/CASE MANAGEMENT/SOCIAL WORK - PATIENT PORTAL LINK FT
You can access the FollowMyHealth Patient Portal offered by [facility] by registering at the following website: [email or web address]. By joining [facility]’s FollowMyHealth portal, you will also be able to view your health information using other applications (apps) compatible with our system.

## 2021-07-02 NOTE — PROGRESS NOTE ADULT - ASSESSMENT
71 y/o male with history of bicuspid aortic valve, who underwent an AVR and ascending aortic aneurysm repair by Dr. Orlando in 2009.  He was evaluated by Dr. Orlando and recommended to see Dr. Carranza for evaluation for TAVR.  PMHx includes AFib PPM/HTN and is now(s/p recent unsuccessful cardioversion on 5/26 at [facility], remains in AFib, started on Eliquis).  ALYSON done.  Angio scheduled to be done at the time of the TAVR.  Had complaints of worsening fatigue, dyspnea with exertion and palpitations over the past two months and more recently developed dyspnea at night requiring him to sleep in his recliner.  Patient is now s/p right femoral approach valve in valve TAVR on 7/1/2021 with Dr. Carranza.  Left femoral TVP DC'd post-procedure. Small amount of dried drainage noted on right groin TAVR site but site is soft, no ecchymosis and bilateral lower extremity pulses are palpable.      7/1- Roger TAVR done  7/2 BL Groin sites stable. Dressings removed. No s/s bleeding/hematoma noted.  69 y/o male with history of bicuspid aortic valve, who underwent an AVR and ascending aortic aneurysm repair by Dr. Orlando in 2009.  He was evaluated by Dr. Orlando and recommended to see Dr. Carranza for evaluation for TAVR.  PMHx includes AFib PPM/HTN and is now(s/p recent unsuccessful cardioversion on 5/26 at [facility], remains in AFib, started on Eliquis).  ALYSON done.  Angio scheduled to be done at the time of the TAVR.  Had complaints of worsening fatigue, dyspnea with exertion and palpitations over the past two months and more recently developed dyspnea at night requiring him to sleep in his recliner.  Patient is now s/p right femoral approach valve in valve TAVR on 7/1/2021 with Dr. Carranza.  Left femoral TVP DC'd post-procedure. Small amount of dried drainage noted on right groin TAVR site but site is soft, no ecchymosis and bilateral lower extremity pulses are palpable.      7/1- Roger TAVR done  7/2 BL Groin sites stable. Dressings removed. Eliquis resumed. No s/s bleeding/hematoma noted.  Patient OOB to chair and ambulating without issues.   71 y/o male with history of bicuspid aortic valve, who underwent an AVR and ascending aortic aneurysm repair by Dr. Orlando in 2009.  He was evaluated by Dr. Orlando and recommended to see Dr. Carranza for evaluation for TAVR.  PMHx includes AFib PPM/HTN and is now(s/p recent unsuccessful cardioversion on 5/26 at [facility], remains in AFib, started on Eliquis).  ALYSON done.  Angio scheduled to be done at the time of the TAVR.  Had complaints of worsening fatigue, dyspnea with exertion and palpitations over the past two months and more recently developed dyspnea at night requiring him to sleep in his recliner.  Patient is now s/p right femoral approach valve in valve TAVR on 7/1/2021 with Dr. Carranza.  Left femoral TVP DC'd post-procedure. Small amount of dried drainage noted on right groin TAVR site but site is soft, no ecchymosis and bilateral lower extremity pulses are palpable.      7/1- Roger TAVR done  7/2 BL Groin sites stable. Dressings removed. Eliquis resumed. No s/s bleeding/hematoma noted.  Patient OOB to chair and ambulating without issues.  ALYSON completed. Results noted  69 y/o male with history of bicuspid aortic valve, who underwent an AVR and ascending aortic aneurysm repair by Dr. Orlando in 2009.  He was evaluated by Dr. Orlando and recommended to see Dr. Carranza for evaluation for TAVR.  PMHx includes AFib PPM/HTN and is now(s/p recent unsuccessful cardioversion on 5/26 at [facility], remains in AFib, started on Eliquis).  ALYSON done.  Angio scheduled to be done at the time of the TAVR.  Had complaints of worsening fatigue, dyspnea with exertion and palpitations over the past two months and more recently developed dyspnea at night requiring him to sleep in his recliner.  Patient is now s/p right femoral approach valve in valve TAVR on 7/1/2021 with Dr. Carranza.  Left femoral TVP DC'd post-procedure. Small amount of dried drainage noted on right groin TAVR site but site is soft, no ecchymosis and bilateral lower extremity pulses are palpable.      7/1- Roger TAVR done  7/2 BL Groin sites stable. Dressings removed. Eliquis resumed. No s/s bleeding/hematoma noted.  Patient OOB to chair and ambulating without issues.  ALYSON completed. Results noted--No aortic regurg seen, normal LV systolic function. 71 y/o male with history of bicuspid aortic valve, who underwent an AVR and ascending aortic aneurysm repair by Dr. Orlando in 2009.  He was evaluated by Dr. Orlando and recommended to see Dr. Carranza for evaluation for TAVR.  PMHx includes AFib PPM/HTN and is now(s/p recent unsuccessful cardioversion on 5/26 at [facility], remains in AFib, started on Eliquis).  ALYSON done.  Angio scheduled to be done at the time of the TAVR.  Had complaints of worsening fatigue, dyspnea with exertion and palpitations over the past two months and more recently developed dyspnea at night requiring him to sleep in his recliner.  Patient is now s/p right femoral approach valve in valve TAVR on 7/1/2021 with Dr. Carranza.  Left femoral TVP DC'd post-procedure. Small amount of dried drainage noted on right groin TAVR site but site is soft, no ecchymosis and bilateral lower extremity pulses are palpable.      7/1- Roger TAVR done  7/2 BL Groin sites stable. Dressings removed. Eliquis resumed. No s/s bleeding/hematoma noted.  Patient OOB to chair and ambulating without issues.  ALYSON completed. Results noted--No aortic regurg seen, normal LV systolic function. DC Home tomorrow AM if stable.

## 2021-07-02 NOTE — PROGRESS NOTE ADULT - PROBLEM SELECTOR PLAN 1
s/p TAVR on 7/1/2021   ASA 81mg   Cefuroxime ppx  Echo/EKG on 7/2  Eliquis resumed  Progress diet as tolerated s/p TAVR on 7/1/2021   ASA 81mg   Cefuroxime ppx  Eliquis resumed  Progress diet as tolerated s/p TAVR on 7/1/2021   ASA 81mg   EKG Daily  Eliquis resumed  Progress diet as tolerated  Dispo- DC Home in AM if stable

## 2021-07-03 ENCOUNTER — TRANSCRIPTION ENCOUNTER (OUTPATIENT)
Age: 71
End: 2021-07-03

## 2021-07-03 VITALS
SYSTOLIC BLOOD PRESSURE: 103 MMHG | HEART RATE: 65 BPM | OXYGEN SATURATION: 92 % | DIASTOLIC BLOOD PRESSURE: 66 MMHG | RESPIRATION RATE: 18 BRPM | TEMPERATURE: 98 F

## 2021-07-03 LAB
ANION GAP SERPL CALC-SCNC: 14 MMOL/L — SIGNIFICANT CHANGE UP (ref 5–17)
BASOPHILS # BLD AUTO: 0.05 K/UL — SIGNIFICANT CHANGE UP (ref 0–0.2)
BASOPHILS NFR BLD AUTO: 0.4 % — SIGNIFICANT CHANGE UP (ref 0–2)
BUN SERPL-MCNC: 24 MG/DL — HIGH (ref 7–23)
CALCIUM SERPL-MCNC: 9.3 MG/DL — SIGNIFICANT CHANGE UP (ref 8.4–10.5)
CHLORIDE SERPL-SCNC: 101 MMOL/L — SIGNIFICANT CHANGE UP (ref 96–108)
CO2 SERPL-SCNC: 24 MMOL/L — SIGNIFICANT CHANGE UP (ref 22–31)
CREAT SERPL-MCNC: 0.86 MG/DL — SIGNIFICANT CHANGE UP (ref 0.5–1.3)
EOSINOPHIL # BLD AUTO: 0.33 K/UL — SIGNIFICANT CHANGE UP (ref 0–0.5)
EOSINOPHIL NFR BLD AUTO: 3 % — SIGNIFICANT CHANGE UP (ref 0–6)
GLUCOSE SERPL-MCNC: 98 MG/DL — SIGNIFICANT CHANGE UP (ref 70–99)
HCT VFR BLD CALC: 40.4 % — SIGNIFICANT CHANGE UP (ref 39–50)
HGB BLD-MCNC: 13 G/DL — SIGNIFICANT CHANGE UP (ref 13–17)
IMM GRANULOCYTES NFR BLD AUTO: 0.4 % — SIGNIFICANT CHANGE UP (ref 0–1.5)
LYMPHOCYTES # BLD AUTO: 2.44 K/UL — SIGNIFICANT CHANGE UP (ref 1–3.3)
LYMPHOCYTES # BLD AUTO: 21.9 % — SIGNIFICANT CHANGE UP (ref 13–44)
MCHC RBC-ENTMCNC: 29.1 PG — SIGNIFICANT CHANGE UP (ref 27–34)
MCHC RBC-ENTMCNC: 32.2 GM/DL — SIGNIFICANT CHANGE UP (ref 32–36)
MCV RBC AUTO: 90.4 FL — SIGNIFICANT CHANGE UP (ref 80–100)
MONOCYTES # BLD AUTO: 1.06 K/UL — HIGH (ref 0–0.9)
MONOCYTES NFR BLD AUTO: 9.5 % — SIGNIFICANT CHANGE UP (ref 2–14)
NEUTROPHILS # BLD AUTO: 7.22 K/UL — SIGNIFICANT CHANGE UP (ref 1.8–7.4)
NEUTROPHILS NFR BLD AUTO: 64.8 % — SIGNIFICANT CHANGE UP (ref 43–77)
NRBC # BLD: 0 /100 WBCS — SIGNIFICANT CHANGE UP (ref 0–0)
PLATELET # BLD AUTO: 180 K/UL — SIGNIFICANT CHANGE UP (ref 150–400)
POTASSIUM SERPL-MCNC: 4.1 MMOL/L — SIGNIFICANT CHANGE UP (ref 3.5–5.3)
POTASSIUM SERPL-SCNC: 4.1 MMOL/L — SIGNIFICANT CHANGE UP (ref 3.5–5.3)
RBC # BLD: 4.47 M/UL — SIGNIFICANT CHANGE UP (ref 4.2–5.8)
RBC # FLD: 14.3 % — SIGNIFICANT CHANGE UP (ref 10.3–14.5)
SODIUM SERPL-SCNC: 139 MMOL/L — SIGNIFICANT CHANGE UP (ref 135–145)
WBC # BLD: 11.14 K/UL — HIGH (ref 3.8–10.5)
WBC # FLD AUTO: 11.14 K/UL — HIGH (ref 3.8–10.5)

## 2021-07-03 PROCEDURE — 80048 BASIC METABOLIC PNL TOTAL CA: CPT

## 2021-07-03 PROCEDURE — 86923 COMPATIBILITY TEST ELECTRIC: CPT

## 2021-07-03 PROCEDURE — C1769: CPT

## 2021-07-03 PROCEDURE — 93355 ECHO TRANSESOPHAGEAL (TEE): CPT

## 2021-07-03 PROCEDURE — 93005 ELECTROCARDIOGRAM TRACING: CPT

## 2021-07-03 PROCEDURE — 85610 PROTHROMBIN TIME: CPT

## 2021-07-03 PROCEDURE — 82962 GLUCOSE BLOOD TEST: CPT

## 2021-07-03 PROCEDURE — 85025 COMPLETE CBC W/AUTO DIFF WBC: CPT

## 2021-07-03 PROCEDURE — C9399: CPT

## 2021-07-03 PROCEDURE — C1887: CPT

## 2021-07-03 PROCEDURE — 71045 X-RAY EXAM CHEST 1 VIEW: CPT

## 2021-07-03 PROCEDURE — 80053 COMPREHEN METABOLIC PANEL: CPT

## 2021-07-03 PROCEDURE — 76000 FLUOROSCOPY <1 HR PHYS/QHP: CPT

## 2021-07-03 PROCEDURE — L8699: CPT

## 2021-07-03 PROCEDURE — C1894: CPT

## 2021-07-03 PROCEDURE — C1725: CPT

## 2021-07-03 PROCEDURE — 85730 THROMBOPLASTIN TIME PARTIAL: CPT

## 2021-07-03 PROCEDURE — C8929: CPT

## 2021-07-03 PROCEDURE — C1773: CPT

## 2021-07-03 PROCEDURE — C1889: CPT

## 2021-07-03 PROCEDURE — C1760: CPT

## 2021-07-03 PROCEDURE — 99238 HOSP IP/OBS DSCHRG MGMT 30/<: CPT

## 2021-07-03 PROCEDURE — 93010 ELECTROCARDIOGRAM REPORT: CPT

## 2021-07-03 RX ORDER — ASPIRIN/CALCIUM CARB/MAGNESIUM 324 MG
1 TABLET ORAL
Qty: 30 | Refills: 0
Start: 2021-07-03 | End: 2021-08-01

## 2021-07-03 RX ORDER — METOPROLOL TARTRATE 50 MG
1.5 TABLET ORAL
Qty: 0 | Refills: 0 | DISCHARGE

## 2021-07-03 RX ORDER — IRBESARTAN 75 MG/1
1 TABLET ORAL
Qty: 0 | Refills: 0 | DISCHARGE

## 2021-07-03 RX ORDER — METOPROLOL SUCCINATE 50 MG/1
3 TABLET, EXTENDED RELEASE ORAL
Refills: 0 | DISCHARGE
Start: 2021-07-03 | End: 2021-08-01

## 2021-07-03 RX ORDER — METOPROLOL TARTRATE 50 MG
3 TABLET ORAL
Qty: 180 | Refills: 0
Start: 2021-07-03 | End: 2021-08-01

## 2021-07-03 RX ADMIN — APIXABAN 5 MILLIGRAM(S): 2.5 TABLET, FILM COATED ORAL at 05:16

## 2021-07-03 RX ADMIN — Medication 81 MILLIGRAM(S): at 10:55

## 2021-07-03 RX ADMIN — Medication 25 MILLIGRAM(S): at 05:16

## 2021-07-03 RX ADMIN — Medication 75 MILLIGRAM(S): at 05:16

## 2021-07-03 NOTE — DISCHARGE NOTE PROVIDER - NSDCFUADDINST_GEN_ALL_CORE_FT
call Dr. Orlando for fever > 101  antibiotic prophylaxis prior to any invasive procedure including dental work  follow up echocardiogram in 30 days at DR. Orlando's office

## 2021-07-03 NOTE — DISCHARGE NOTE PROVIDER - NSDCMRMEDTOKEN_GEN_ALL_CORE_FT
amLODIPine 5 mg oral tablet: 1 tab(s) orally once a day (at bedtime)  aspirin 81 mg oral delayed release tablet: 1 tab(s) orally once a day  Eliquis 5 mg oral tablet: 1 tab(s) orally 2 times a day. Instructed to take last dose on 6/28/21 by surgeon.   Flomax 0.4 mg oral capsule: 1 cap(s) orally once a day (at bedtime)  hydroCHLOROthiazide 25 mg oral tablet: 1 tab(s) orally once a day  metoprolol succinate 25 mg oral tablet, extended release: 3 tab(s) (total 75 mg) orally 2 times a day

## 2021-07-03 NOTE — DISCHARGE NOTE PROVIDER - NSDCPNSUBOBJ_GEN_ALL_CORE
Neurology: alert and oriented x 3, nonfocal, no gross deficits  CV:Irreg/Irreg tele: AFIB with v.pacing 60-80   Lungs: Clear to ascultation bilaterally no cough no wheeze   Abdomen: soft, nontender, nondistended, positive bowel sounds  :  pt voiding without difficulty   Extremities:   MATT; BL groin sites c/d/i.  No s/s hematoma/bleeding.  No LE edema, neg calf tenderness.   PPP bilaterally        Discharge pt home today as per DR. Orlando - 7/3

## 2021-07-03 NOTE — DISCHARGE NOTE PROVIDER - CARE PROVIDER_API CALL
Jaron Orlando)  Surgery; Thoracic and Cardiac Surgery  [address] [place] [address]  Phone: [phone]  Fax: [phone]  Follow Up Time:     Jose Alberto Workman)  Cardiovascular Disease; Internal Medicine  [address] [place] [address]  Phone: [phone]  Fax: [phone]  Follow Up Time:

## 2021-07-03 NOTE — DISCHARGE NOTE PROVIDER - CARE PROVIDERS DIRECT ADDRESSES
[email or web address]

## 2021-07-03 NOTE — DISCHARGE NOTE PROVIDER - NSDCCPCAREPLAN_GEN_ALL_CORE_FT
PRINCIPAL DISCHARGE DIAGNOSIS  Diagnosis: S/P TAVR (transcatheter aortic valve replacement)  Assessment and Plan of Treatment: S/P TAVR (transcatheter aortic valve replacement)  shower daily  weigh yourself daily  continue current prescriptions as ordered  increase activity as tolerated   no added salt; low fat; low cholesterol, low salt diet.   follow up with Cardiologist, Dr. Workman, in 1-2 weeks. Call to schedule appointment.  follow up with cardiac surgeon, Dr. Vyas next week. Call office tue 7/6 to schedule appointment. [phone]  follow up Echocardiogram in 30 days at Dr. Vyas's office- call to schedule appointment. [phone]

## 2021-07-03 NOTE — DISCHARGE NOTE PROVIDER - HOSPITAL COURSE
71 y/o male with history of bicuspid aortic valve, who underwent an AVR and ascending aortic aneurysm repair by Dr. Orlando in 2009.  He was evaluated by Dr. Orlando and recommended to see Dr. Carranza for evaluation for TAVR.  PMHx includes AFib PPM/HTN and is now(s/p recent unsuccessful cardioversion on 5/26 at [facility], remains in AFib, started on Eliquis).  ALYSON done.  Angio scheduled to be done at the time of the TAVR.  Had complaints of worsening fatigue, dyspnea with exertion and palpitations over the past two months and more recently developed dyspnea at night requiring him to sleep in his recliner.  Patient is now s/p right femoral approach valve in valve TAVR on 7/1/2021 with Dr. Carranza.  Left femoral TVP DC'd post-procedure. Small amount of dried drainage noted on right groin TAVR site but site is soft, no ecchymosis and bilateral lower extremity pulses are palpable.      7/1- Roger TAVR done  7/2 BL Groin sites stable. Dressings removed. Eliquis resumed. No s/s bleeding/hematoma noted.  Patient OOB to chair and ambulating without issues.  ALYSON completed. Results noted--No aortic regurg seen, normal LV systolic function. DC Home tomorrow AM if stable.  7/3 VSS; ELIQUIS for anticoagulation; discharge pt home today as per Dr. Orlando-  no MCOT - pt has a PPM

## 2021-07-04 ENCOUNTER — TRANSCRIPTION ENCOUNTER (OUTPATIENT)
Age: 71
End: 2021-07-04

## 2021-07-05 RX ORDER — APIXABAN 5 MG/1
5 TABLET, FILM COATED ORAL
Qty: 60 | Refills: 0 | Status: ACTIVE | COMMUNITY

## 2021-07-05 RX ORDER — MUPIROCIN 2 G/100G
2 CREAM TOPICAL
Qty: 1 | Refills: 0 | Status: DISCONTINUED | COMMUNITY
Start: 2021-06-25 | End: 2021-07-05

## 2021-07-05 RX ORDER — METOPROLOL SUCCINATE 25 MG/1
25 TABLET, EXTENDED RELEASE ORAL
Refills: 0 | Status: ACTIVE | COMMUNITY

## 2021-07-05 RX ORDER — TAMSULOSIN HYDROCHLORIDE 0.4 MG/1
0.4 CAPSULE ORAL
Qty: 90 | Refills: 0 | Status: ACTIVE | COMMUNITY

## 2021-07-05 RX ORDER — MUPIROCIN 20 MG/G
2 OINTMENT TOPICAL
Qty: 1 | Refills: 0 | Status: DISCONTINUED | COMMUNITY
Start: 2021-06-30 | End: 2021-07-05

## 2021-07-05 RX ORDER — ASPIRIN 81 MG/1
81 TABLET, COATED ORAL DAILY
Refills: 0 | Status: ACTIVE | COMMUNITY
Start: 2021-07-05

## 2021-07-05 RX ORDER — IRBESARTAN 300 MG/1
300 TABLET ORAL DAILY
Refills: 0 | Status: DISCONTINUED | COMMUNITY
Start: 2021-06-03 | End: 2021-07-05

## 2021-07-05 RX ORDER — HYDROCHLOROTHIAZIDE 25 MG/1
25 TABLET ORAL DAILY
Qty: 30 | Refills: 0 | Status: ACTIVE | COMMUNITY

## 2021-07-05 RX ORDER — AMLODIPINE BESYLATE 5 MG/1
5 TABLET ORAL DAILY
Refills: 0 | Status: ACTIVE | COMMUNITY

## 2021-07-06 ENCOUNTER — NON-APPOINTMENT (OUTPATIENT)
Age: 71
End: 2021-07-06

## 2021-07-07 PROBLEM — I48.91 UNSPECIFIED ATRIAL FIBRILLATION: Chronic | Status: ACTIVE | Noted: 2021-06-24

## 2021-07-07 PROBLEM — Z87.438 PERSONAL HISTORY OF OTHER DISEASES OF MALE GENITAL ORGANS: Chronic | Status: ACTIVE | Noted: 2021-06-24

## 2021-07-07 PROBLEM — I34.0 NONRHEUMATIC MITRAL (VALVE) INSUFFICIENCY: Chronic | Status: ACTIVE | Noted: 2021-06-24

## 2021-07-07 PROBLEM — I10 ESSENTIAL (PRIMARY) HYPERTENSION: Chronic | Status: ACTIVE | Noted: 2021-06-24

## 2021-07-07 PROBLEM — Z98.890 OTHER SPECIFIED POSTPROCEDURAL STATES: Chronic | Status: ACTIVE | Noted: 2021-06-24

## 2021-07-08 ENCOUNTER — APPOINTMENT (OUTPATIENT)
Dept: CARE COORDINATION | Facility: HOME HEALTH | Age: 71
End: 2021-07-08

## 2021-07-08 VITALS
OXYGEN SATURATION: 96 % | WEIGHT: 224 LBS | DIASTOLIC BLOOD PRESSURE: 64 MMHG | SYSTOLIC BLOOD PRESSURE: 124 MMHG | HEART RATE: 84 BPM | RESPIRATION RATE: 16 BRPM | TEMPERATURE: 97.9 F | BODY MASS INDEX: 31.24 KG/M2

## 2021-07-08 PROBLEM — I48.91 AFIB: Status: ACTIVE | Noted: 2021-06-02

## 2021-07-08 PROBLEM — Z95.0 PRESENCE OF CARDIAC PACEMAKER: Status: ACTIVE | Noted: 2021-07-08

## 2021-07-08 NOTE — HISTORY OF PRESENT ILLNESS
[FreeTextEntry1] : As per EMR:\par \par Hospital Course: 69 y/o male with history of bicuspid aortic valve, who underwent an AVR and ascending aortic aneurysm repair by Dr. Orlando in 2009. He was evaluated by Dr. Orlando and recommended to see Dr. Carranza for evaluation for TAVR. PMHx includes AFib PPM/HTN and is now(s/p recent unsuccessful cardioversion on 5/26 at [facility], remains in AFib, started on Eliquis). ALYSON done. Angio scheduled to be done at the time of the TAVR. Had complaints of worsening fatigue, dyspnea with exertion and palpitations over the past two months and more recently developed dyspnea at night requiring him to sleep in his recliner. Patient is now s/p right femoral approach valve in valve TAVR on 7/1/2021 with Dr. Carranza. Left femoral TVP DC'd post-procedure. Small amount of dried drainage noted on right groin TAVR site but site is soft, no ecchymosis and bilateral lower extremity pulses are palpable. \par 7/1- Roger TAVR done\par 7/2 BL Groin sites stable. Dressings removed. Eliquis resumed. No s/s bleeding/hematoma noted. Patient OOB to chair and ambulating without issues. ALYSON completed. Results noted--No aortic regurg seen, normal LV systolic function. DC Home tomorrow AM if stable.\par 7/3 VSS; Eliquis for anticoagulation; discharge pt home today as per Dr. Orlando- no MCOT - pt has a PPM.\par \par Pt. discharged home on 7/3/21. He is seen & examined in his home today in routine f/u. He states he is doing well. He offers no complaints at this time.

## 2021-07-08 NOTE — ASSESSMENT
[FreeTextEntry1] : Overweight, older male doing well s/p TAVR.\par \par Problems:\par 1. HTN/a-fib (recent failed cardioversion)/bicuspid aortic valve: s/p PPM, AVR & AAA repair: now s/p MILAGRO TAVR.\par c/w daily weights, temps and showers.\par Continue with current meds: Amlodipine, ASA, Eliquis, HCTZ & Metoprolol.\par Educated on bleeding precautions.\par Ambulate as tolerated.\par Diet- low salt, low fat.\par f/u appts - CTS, Cardiology & PCP.\par 1 month f/u with structural heart for echocardiogram.\par [facility] team will continue to f/u with pt status. \par Pt agrees to call with any questions, issues or concerns.\par

## 2021-07-08 NOTE — PHYSICAL EXAM
[Sclera] : the sclera and conjunctiva were normal [Extraocular Movements] : extraocular movements were intact [Neck Appearance] : the appearance of the neck was normal [Jugular Venous Distention Increased] : there was no jugular-venous distention [] : no respiratory distress [Respiration, Rhythm And Depth] : normal respiratory rhythm and effort [Exaggerated Use Of Accessory Muscles For Inspiration] : no accessory muscle use [Auscultation Breath Sounds / Voice Sounds] : lungs were clear to auscultation bilaterally [Heart Sounds] : normal S1 and S2 [Murmurs] : no murmurs [Normal] : the heart rate was normal [Irregularly Irregular] : the rhythm was irregularly irregular [Examination Of The Chest] : the chest was normal in appearance [Chest Visual Inspection Thoracic Asymmetry] : no chest asymmetry [Diminished Respiratory Excursion] : normal chest expansion [2+] : left 2+ [Fingers] :  capillary refill of the fingers was normal [Toes] :  capillary refill of the toes was normal [Bowel Sounds] : normal bowel sounds [Abdomen Soft] : soft [Abdomen Tenderness] : non-tender [Abnormal Walk] : normal gait [Nail Clubbing] : no clubbing  or cyanosis of the fingernails [Motor Tone] : muscle strength and tone were normal [Skin Color & Pigmentation] : normal skin color and pigmentation [Skin Turgor] : normal skin turgor [FreeTextEntry1] : Right groin site healing well with resolving ecchymosis. No erythema, warmth or drainage. Left groin site essentially healed.  [No Focal Deficits] : no focal deficits [Oriented To Time, Place, And Person] : oriented to person, place, and time [Impaired Insight] : insight and judgment were intact [Affect] : the affect was normal [Mood] : the mood was normal

## 2021-07-09 ENCOUNTER — APPOINTMENT (OUTPATIENT)
Dept: CARDIOTHORACIC SURGERY | Facility: CLINIC | Age: 71
End: 2021-07-09
Payer: MEDICARE

## 2021-07-09 VITALS
BODY MASS INDEX: 31.36 KG/M2 | HEART RATE: 88 BPM | TEMPERATURE: 98 F | DIASTOLIC BLOOD PRESSURE: 88 MMHG | OXYGEN SATURATION: 98 % | WEIGHT: 224 LBS | SYSTOLIC BLOOD PRESSURE: 145 MMHG | RESPIRATION RATE: 16 BRPM | HEIGHT: 71 IN

## 2021-07-09 DIAGNOSIS — Z95.0 PRESENCE OF CARDIAC PACEMAKER: ICD-10-CM

## 2021-07-09 DIAGNOSIS — I48.91 UNSPECIFIED ATRIAL FIBRILLATION: ICD-10-CM

## 2021-07-09 PROCEDURE — 99213 OFFICE O/P EST LOW 20 MIN: CPT

## 2021-07-10 ENCOUNTER — TRANSCRIPTION ENCOUNTER (OUTPATIENT)
Age: 71
End: 2021-07-10

## 2021-07-20 ENCOUNTER — TRANSCRIPTION ENCOUNTER (OUTPATIENT)
Age: 71
End: 2021-07-20

## 2021-07-27 ENCOUNTER — TRANSCRIPTION ENCOUNTER (OUTPATIENT)
Age: 71
End: 2021-07-27

## 2021-08-02 ENCOUNTER — TRANSCRIPTION ENCOUNTER (OUTPATIENT)
Age: 71
End: 2021-08-02

## 2021-08-12 ENCOUNTER — NON-APPOINTMENT (OUTPATIENT)
Age: 71
End: 2021-08-12

## 2021-08-12 ENCOUNTER — APPOINTMENT (OUTPATIENT)
Dept: CARDIOTHORACIC SURGERY | Facility: CLINIC | Age: 71
End: 2021-08-12
Payer: MEDICARE

## 2021-08-12 VITALS
RESPIRATION RATE: 18 BRPM | HEART RATE: 78 BPM | SYSTOLIC BLOOD PRESSURE: 148 MMHG | DIASTOLIC BLOOD PRESSURE: 93 MMHG | BODY MASS INDEX: 32.2 KG/M2 | WEIGHT: 230 LBS | TEMPERATURE: 98.1 F | OXYGEN SATURATION: 98 % | HEIGHT: 71 IN

## 2021-08-12 VITALS — DIASTOLIC BLOOD PRESSURE: 72 MMHG | SYSTOLIC BLOOD PRESSURE: 120 MMHG

## 2021-08-12 DIAGNOSIS — Z95.3 PRESENCE OF XENOGENIC HEART VALVE: ICD-10-CM

## 2021-08-12 LAB
ANION GAP SERPL CALC-SCNC: 10 MMOL/L
BASOPHILS # BLD AUTO: 0.04 K/UL
BASOPHILS NFR BLD AUTO: 0.4 %
BUN SERPL-MCNC: 13 MG/DL
CALCIUM SERPL-MCNC: 10.6 MG/DL
CHLORIDE SERPL-SCNC: 97 MMOL/L
CO2 SERPL-SCNC: 30 MMOL/L
CREAT SERPL-MCNC: 0.9 MG/DL
EOSINOPHIL # BLD AUTO: 0.12 K/UL
EOSINOPHIL NFR BLD AUTO: 1.3 %
GLUCOSE SERPL-MCNC: 130 MG/DL
HCT VFR BLD CALC: 49.3 %
HGB BLD-MCNC: 16.2 G/DL
IMM GRANULOCYTES NFR BLD AUTO: 0.3 %
LYMPHOCYTES # BLD AUTO: 1.69 K/UL
LYMPHOCYTES NFR BLD AUTO: 18 %
MAN DIFF?: NORMAL
MCHC RBC-ENTMCNC: 28.8 PG
MCHC RBC-ENTMCNC: 32.9 GM/DL
MCV RBC AUTO: 87.7 FL
MONOCYTES # BLD AUTO: 0.81 K/UL
MONOCYTES NFR BLD AUTO: 8.6 %
NEUTROPHILS # BLD AUTO: 6.69 K/UL
NEUTROPHILS NFR BLD AUTO: 71.4 %
PLATELET # BLD AUTO: 241 K/UL
POTASSIUM SERPL-SCNC: 4.6 MMOL/L
RBC # BLD: 5.62 M/UL
RBC # FLD: 13.8 %
SODIUM SERPL-SCNC: 138 MMOL/L
WBC # FLD AUTO: 9.38 K/UL

## 2021-08-12 PROCEDURE — 93000 ELECTROCARDIOGRAM COMPLETE: CPT

## 2021-08-12 PROCEDURE — 99213 OFFICE O/P EST LOW 20 MIN: CPT

## 2021-08-24 NOTE — REVIEW OF SYSTEMS
[Fever] : no fever [Chills] : no chills [Blurry Vision] : no blurred vision [Earache] : no earache [SOB] : no shortness of breath [Dyspnea on exertion] : not dyspnea during exertion [Chest Discomfort] : no chest discomfort [Lower Ext Edema] : no extremity edema [Leg Claudication] : no intermittent leg claudication [Palpitations] : no palpitations [Syncope] : no syncope [Cough] : no cough [Wheezing] : no wheezing [Abdominal Pain] : no abdominal pain [Urinary Frequency] : no change in urinary frequency [Dizziness] : no dizziness [Rash] : no rash [Weakness] : no weakness [Confusion] : no confusion was observed [FreeTextEntry9] : Left knee pain

## 2021-08-24 NOTE — PHYSICAL EXAM
[Well Developed] : well developed [Well Nourished] : well nourished [No Acute Distress] : no acute distress [Normal Conjunctiva] : normal conjunctiva [Normal Venous Pressure] : normal venous pressure [No Carotid Bruit] : no carotid bruit [Normal Rate] : normal [Heart Rate ___] : [unfilled] bpm [Rhythm Regular] : regular [Normal S1] : normal S1 [Normal S2] : normal S2 [No Gallop] : no gallop heard [Prosthetic Aortic Valve] : prosthetic aortic valve heard [No Pitting Edema] : no pitting edema present [2+] : left 2+ [No Abnormalities] : the abdominal aorta was not enlarged and no bruit was heard [Clear Lung Fields] : clear lung fields [Good Air Entry] : good air entry [No Respiratory Distress] : no respiratory distress  [Soft] : abdomen soft [Non Tender] : non-tender [Normal Bowel Sounds] : normal bowel sounds [Normal Gait] : normal gait [Gait - Sufficient for Exercise Testing] : gait - sufficient for exercise testing [No Edema] : no edema [No Varicosities] : no varicosities [Normal Radial B/L] : normal radial B/L [Normal DP B/L] : normal DP B/L [No Rash] : no rash [No Skin Lesions] : no skin lesions [Moves all extremities] : moves all extremities [No Focal Deficits] : no focal deficits [Normal Speech] : normal speech [Alert and Oriented] : alert and oriented [Normal memory] : normal memory [S3] : no S3 [S4] : no S4 [Click] : no click [Distant] : the heart sounds were ~L not distant [Pericardial Rub] : no pericardial rub [Right Carotid Bruit] : no bruit heard over the right carotid [Left Carotid Bruit] : no bruit heard over the left carotid [Bruit] : no bruit heard [de-identified] : Groin sites stable and healing well.

## 2021-08-24 NOTE — CARDIOLOGY SUMMARY
[de-identified] : Atrial fib with RBBB, rate 81 (demand PPM) [de-identified] : 8/12/2021 Done on 7/30/2021@ Dr. Workman's office. Called for report ([phone]). \par 7/2/2021 Transcatheter aortic valve replacement (#29 mm Evolut Pro Plus). Peak transaortic valve gradient equals 18 mm Hg, mean transaortic valve gradient equals 9 mm Hg, which is probably normal in the presence of a transcatheter aortic valve replacement. No aortic valve regurgitation seen. Moderately dilated left atrium.  LA volume index = 47 cc/m2. Normal left ventricular systolic function.   Endocardial visualization enhanced with intravenous injection of Ultrasonic Enhancing Agent (Definity). Peak left ventricular outflow tract gradient equals 2 mm Hg, mean gradient is equal to 1 mm Hg, LVOT velocity time integral equals 16 cm. Increased E/e'  is consistent with elevated left ventricular filling pressure. Right ventricular enlargement with decreased right ventricular systolic function. \par  [de-identified] : 6/24/2021 CT HEART W/O CORONARIES PRE-TAVR Status post aortic valve and ascending aorta replacement. 3.7 x 4 cm heterogeneous enhancing mass with inferior pole of the right kidney suspicious for renal cell carcinoma. Recommend further evaluation with MRI or CT renal mass protocol and urology consult. 1 cm short axis periportal lymph node (series 21 image 105) is indeterminant. Recommend attention on follow-up.\par  [de-identified] : 11/03/2017 [place] (DDD ) [de-identified] : 7/1/2021 CATH The coronary circulation is right dominant. LM: Normal. LAD: Angiography showed minor luminal irregularities with no\par flow limiting lesions. Circumflex: Angiography showed mild atherosclerosis with no flow limiting lesions. RCA: Normal. AORTA: The root exhibited normal size.\par LEFT LOWER EXTREMITY VESSELS: Left common iliac: The vessel was patent. Left internal iliac: The vessel was patent. Left external iliac: The vessel was patent. Left common femoral: The vessel was patent. RIGHT LOWER EXTREMITY VESSELS: Right common iliac: The vessel was patent. Right internal iliac: The vessel was patent. Right external iliac: The vessel was patent. Right common femoral: The vessel was patent. Ostial right superficial femoral: The vessel was patent. Ostial right deep femoral: The vessel was patent. \par  [de-identified] : 7/1/2021 CIELO valve-in-valve using a  29 mm CORE Evolut Pro Plus bioprosthesis\par \par 8/19/2009: AVR using a Moy-Chavez Therma Fix 27 mm Bovine pericardial aortic valve prosthesis, Model #2700. Repair of ascending aortic aneurysm using a 30 mm Woven Dacron Hemashield graft with profound hypothermia and total circulatory arrest.  (Dr. Orlando). \par  [de-identified] : 6/24/2021 CAROTID DUPLEX  Bilateral plaque. No significant hemodynamic stenosis of either carotid artery.\par

## 2021-08-24 NOTE — HISTORY OF PRESENT ILLNESS
[FreeTextEntry1] : SADE JAMES is a 70 year old male here on 08/12/2021, 5 weeks after undergoing a transfemoral transcatheter aortic valve-in-valve implantation using a 29 mm CORE Evolut Pro Plus bioprosthesis on 7/1/2021.  Post-operative course was uneventful and he was discharged on POD #2. \par \par He comes to the office today with a new TTE reporting feeling "good" overall. He currently denies fever, chills, cough, palpitations, angina, dyspnea, dizziness, lightheadedness, syncope, or peripheral edema. His energy level is "getting better" and appetite is good. Denies bowel or bladder problems. He has had no hospitalizations or ER visits since having his CIELO. He has a bad left knee so sometimes it gets in his way but he is very active at home and walks his dogs several times a day. He saw Dr. Workman on 07/30/2021 in which he had a TTE which he brought with him today and had his PPM interrogated. He will see his urologist on Monday morning for follow up of CT scan results regarding abnormal findings.\par \par 5 meter walk 6.10/ 5.02/ 5.05\par NYHA I\par CARD: Dr. Jose Alberto Workman ([facility]par URO: Dr. Dooley

## 2021-08-24 NOTE — DISCUSSION/SUMMARY
[FreeTextEntry1] : Plan:\par 1) Stable 5 weeks s/p CIELO (valve-in-valve using a  29 mm CORE Evolut Pro Plus bioprosthesis on 7/1/2021)\par 2) BP is at goal - Medication changes: none\par 3) TTE done today - results pending. Will call patient with TTE results once available. \par 4) To follow-up regularly with cardiology as scheduled (Dr. Jose Alberto Workman)\par 5) To follow-up regularly with PCP as scheduled ( )\par 6) To follow-up with structural heart clinic yearly with TTE or sooner if needed.\par 7) Antibiotic prophylaxis discussed\par 8) CT HEART W/O CORONARIES PRE-TAVR demonstrated a 3.7 x 4 cm heterogeneous enhancing mass with inferior pole of the right kidney suspicious for renal cell carcinoma. Recommend further evaluation with MRI or CT renal mass protocol and urology consult. 1 cm short axis periportal lymph node (series 21 image 105) is indeterminant. Recommend attention on follow-up. Has appt. with urology next week.

## 2021-08-24 NOTE — ADDENDUM
[FreeTextEntry1] : Pt. aware that TTE done 7/30/2021 demonstrated CIELO valve in good position with normal function. LVEF 41% (previously 50-55%). Labs reviewed with pt. Will review actual TTE images with Dr. Carranza. Pt. aware and states that he saw urology and he ordered an MRI but he checked with EP who states his PPM is NOT MRI compatible. He is waiting on urology for the next steps. \par \par 8/20/2021: Reviewed with Dr. Carranza Decreased LVDF post CIELO with normal coronaries at cath. He called Dr. Workman to review and left a message for him to call back. Recommend GDMT and repeat TTE in 3 months. Pt made aware 8/24.

## 2025-04-15 ENCOUNTER — OUTPATIENT (OUTPATIENT)
Dept: OUTPATIENT SERVICES | Facility: HOSPITAL | Age: 75
LOS: 1 days | End: 2025-04-15
Payer: MEDICARE

## 2025-04-15 VITALS
TEMPERATURE: 98 F | RESPIRATION RATE: 16 BRPM | HEIGHT: 71 IN | DIASTOLIC BLOOD PRESSURE: 75 MMHG | SYSTOLIC BLOOD PRESSURE: 122 MMHG | OXYGEN SATURATION: 97 % | WEIGHT: 199.96 LBS | HEART RATE: 79 BPM

## 2025-04-15 DIAGNOSIS — Z95.2 PRESENCE OF PROSTHETIC HEART VALVE: Chronic | ICD-10-CM

## 2025-04-15 DIAGNOSIS — Z98.890 OTHER SPECIFIED POSTPROCEDURAL STATES: Chronic | ICD-10-CM

## 2025-04-15 DIAGNOSIS — Z01.818 ENCOUNTER FOR OTHER PREPROCEDURAL EXAMINATION: ICD-10-CM

## 2025-04-15 DIAGNOSIS — Z95.0 PRESENCE OF CARDIAC PACEMAKER: Chronic | ICD-10-CM

## 2025-04-15 DIAGNOSIS — N28.89 OTHER SPECIFIED DISORDERS OF KIDNEY AND URETER: ICD-10-CM

## 2025-04-15 DIAGNOSIS — Z96.651 PRESENCE OF RIGHT ARTIFICIAL KNEE JOINT: Chronic | ICD-10-CM

## 2025-04-15 DIAGNOSIS — Z95.0 PRESENCE OF CARDIAC PACEMAKER: ICD-10-CM

## 2025-04-15 DIAGNOSIS — I48.91 UNSPECIFIED ATRIAL FIBRILLATION: ICD-10-CM

## 2025-04-15 LAB
ANION GAP SERPL CALC-SCNC: 12 MMOL/L — SIGNIFICANT CHANGE UP (ref 5–17)
BLD GP AB SCN SERPL QL: NEGATIVE — SIGNIFICANT CHANGE UP
BUN SERPL-MCNC: 16 MG/DL — SIGNIFICANT CHANGE UP (ref 7–23)
CALCIUM SERPL-MCNC: 10 MG/DL — SIGNIFICANT CHANGE UP (ref 8.4–10.5)
CHLORIDE SERPL-SCNC: 93 MMOL/L — LOW (ref 96–108)
CO2 SERPL-SCNC: 26 MMOL/L — SIGNIFICANT CHANGE UP (ref 22–31)
CREAT SERPL-MCNC: 0.63 MG/DL — SIGNIFICANT CHANGE UP (ref 0.5–1.3)
EGFR: 99 ML/MIN/1.73M2 — SIGNIFICANT CHANGE UP
EGFR: 99 ML/MIN/1.73M2 — SIGNIFICANT CHANGE UP
GLUCOSE SERPL-MCNC: 161 MG/DL — HIGH (ref 70–99)
HCT VFR BLD CALC: 37.3 % — LOW (ref 39–50)
HGB BLD-MCNC: 11.5 G/DL — LOW (ref 13–17)
MCHC RBC-ENTMCNC: 23.7 PG — LOW (ref 27–34)
MCHC RBC-ENTMCNC: 30.8 G/DL — LOW (ref 32–36)
MCV RBC AUTO: 76.7 FL — LOW (ref 80–100)
NRBC BLD AUTO-RTO: 0 /100 WBCS — SIGNIFICANT CHANGE UP (ref 0–0)
PLATELET # BLD AUTO: 324 K/UL — SIGNIFICANT CHANGE UP (ref 150–400)
POTASSIUM SERPL-MCNC: 4 MMOL/L — SIGNIFICANT CHANGE UP (ref 3.5–5.3)
POTASSIUM SERPL-SCNC: 4 MMOL/L — SIGNIFICANT CHANGE UP (ref 3.5–5.3)
RBC # BLD: 4.86 M/UL — SIGNIFICANT CHANGE UP (ref 4.2–5.8)
RBC # FLD: 17.5 % — HIGH (ref 10.3–14.5)
RH IG SCN BLD-IMP: POSITIVE — SIGNIFICANT CHANGE UP
SODIUM SERPL-SCNC: 131 MMOL/L — LOW (ref 135–145)
WBC # BLD: 8.92 K/UL — SIGNIFICANT CHANGE UP (ref 3.8–10.5)
WBC # FLD AUTO: 8.92 K/UL — SIGNIFICANT CHANGE UP (ref 3.8–10.5)

## 2025-04-15 PROCEDURE — 85027 COMPLETE CBC AUTOMATED: CPT

## 2025-04-15 PROCEDURE — 87086 URINE CULTURE/COLONY COUNT: CPT

## 2025-04-15 PROCEDURE — G0463: CPT

## 2025-04-15 PROCEDURE — 80048 BASIC METABOLIC PNL TOTAL CA: CPT

## 2025-04-15 PROCEDURE — 86901 BLOOD TYPING SEROLOGIC RH(D): CPT

## 2025-04-15 PROCEDURE — 86850 RBC ANTIBODY SCREEN: CPT

## 2025-04-15 PROCEDURE — 86900 BLOOD TYPING SEROLOGIC ABO: CPT

## 2025-04-15 RX ORDER — LIDOCAINE HCL/PF 10 MG/ML
0.2 VIAL (ML) INJECTION ONCE
Refills: 0 | Status: DISCONTINUED | OUTPATIENT
Start: 2025-05-06 | End: 2025-05-06

## 2025-04-15 RX ORDER — CEFAZOLIN SODIUM IN 0.9 % NACL 3 G/100 ML
2000 INTRAVENOUS SOLUTION, PIGGYBACK (ML) INTRAVENOUS ONCE
Refills: 0 | Status: COMPLETED | OUTPATIENT
Start: 2025-05-06 | End: 2025-05-06

## 2025-04-15 NOTE — H&P PST ADULT - NSICDXPASTSURGICALHX_GEN_ALL_CORE_FT
PAST SURGICAL HISTORY:  H/O aortic valve repair 2009    H/O lithotripsy 2/2020    History of cardioversion 5/26/21 [facility] by Dr. Workman    History of hand surgery     History of transcatheter aortic valve implantation (CIELO)     Pacemaker 2009, 2017    S/P release of urethral stricture due to kidney stone which traveled to urethra s/p lithotripsy 2/2020    Status post right knee replacement 2015

## 2025-04-15 NOTE — H&P PST ADULT - PROBLEM SELECTOR PLAN 1
Pt is scheduled for a right robotic nephrectomy, cystoscopy dilation, flexible with Dr. Goldberg on 5/6/25  CBC, BMP, T/S and urine culture ordered and obtained at PST  ABO on admit  Chlorhexidine soap and instructions reviewed and provided to pt with teach back understanding

## 2025-04-15 NOTE — H&P PST ADULT - NSICDXPASTMEDICALHX_GEN_ALL_CORE_FT
PAST MEDICAL HISTORY:  Afib     Former smoker     History of BPH     Hypertension     Kidney stones     Mitral insufficiency     Other specified disorders of kidney and ureter

## 2025-04-15 NOTE — H&P PST ADULT - RESPIRATORY AND THORAX COMMENTS
> 3 weeks - nonproductive - as per pt, CXR clear when admitted to [facility] a few weeks ago

## 2025-04-15 NOTE — H&P PST ADULT - PROBLEM SELECTOR PLAN 2
As per pt, Eliquis discontinued due to recent hospital admission with bleeding into bladder from possible kidney stone  Cardiac eval from 5/1/25 requested

## 2025-04-15 NOTE — H&P PST ADULT - ASSESSMENT
DASI score: 7.99  DASI activity: Able to walk 2-3 blocks, ADLs, Grocery Shopping, 1 Flight of Stairs, walking outdoor with dogs (weather-permitting) for about 1 hour a few times per week  Loose teeth or denture: denies    CAPRINI SCORE    AGE RELATED RISK FACTORS                                                             [ ] Age 41-60 years                                            (1 Point)  [ ] Age: 61-74 years                                           (2 Points)                 [X ] Age= 75 years                                                (3 Points)             DISEASE RELATED RISK FACTORS                                                       [ ] Edema in the lower extremities                 (1 Point)                     [ ] Varicose veins                                               (1 Point)                                 [ X] BMI > 25 Kg/m2                                            (1 Point)                                  [ ] Serious infection (ie PNA)                            (1 Point)                     [ ] Lung disease ( COPD, Emphysema)            (1 Point)                                                                          [ ] Acute myocardial infarction                         (1 Point)                  [ ] Congestive heart failure (in the previous month)  (1 Point)         [ ] Inflammatory bowel disease                            (1 Point)                  [ ] Central venous access, PICC or Port               (2 points)       (within the last month)                                                                [ ] Stroke (in the previous month)                        (5 Points)    [ ] Previous or present malignancy                       (2 points)                                                                                                                                                         HEMATOLOGY RELATED FACTORS                                                         [ ] Prior episodes of VTE                                     (3 Points)                     [ ] Positive family history for VTE                      (3 Points)                  [ ] Prothrombin 10137 A                                     (3 Points)                     [ ] Factor V Leiden                                                (3 Points)                        [ ] Lupus anticoagulants                                      (3 Points)                                                           [ ] Anticardiolipin antibodies                              (3 Points)                                                       [ ] High homocysteine in the blood                   (3 Points)                                             [ ] Other congenital or acquired thrombophilia      (3 Points)                                                [ ] Heparin induced thrombocytopenia                  (3 Points)                                        MOBILITY RELATED FACTORS  [ ] Bed rest                                                         (1 Point)  [ ] Plaster cast                                                    (2 points)  [ ] Bed bound for more than 72 hours           (2 Points)    GENDER SPECIFIC FACTORS  [ ] Pregnancy or had a baby within the last month   (1 Point)  [ ] Post-partum < 6 weeks                                   (1 Point)  [ ] Hormonal therapy  or oral contraception   (1 Point)  [ ] History of pregnancy complications              (1 point)  [ ] Unexplained or recurrent              (1 Point)    OTHER RISK FACTORS                                           (1 Point)  [ ] BMI >40, smoking, diabetes requiring insulin, chemotherapy  blood transfusions and length of surgery over 2 hours    SURGERY RELATED RISK FACTORS  [ ]  Section within the last month     (1 Point)  [ ] Minor surgery                                                  (1 Point)  [ ] Arthroscopic surgery                                       (2 Points)  [X] Planned major surgery lasting more            (2 Points)      than 45 minutes     [ ] Elective hip or knee joint replacement       (5 points)       surgery                                                TRAUMA RELATED RISK FACTORS  [ ] Fracture of the hip, pelvis, or leg                       (5 Points)  [ ] Spinal cord injury resulting in paralysis             (5 points)       (in the previous month)    [ ] Paralysis  (less than 1 month)                             (5 Points)  [ ] Multiple Trauma within 1 month                        (5 Points)    Total Score [   6     ]    Caprini Score 0-2: Low Risk, NO VTE prophylaxis required for most patients, encourage ambulation  Caprini Score 3-6: Moderate Risk , pharmacologic VTE prophylaxis is indicated for most patients (in the absence of contraindications)  Caprini Score Greater than or =7: High risk, pharmocologic VTE prophylaxis indicated for most patients (in the absence of contraindications)   DASI score: 7.99  DASI activity: Able to walk 2-3 blocks, ADLs, Grocery Shopping, 1 Flight of Stairs, walking outdoor with dogs (weather-permitting) for about 1 hour a few times per week  Loose teeth or denture: denies    CAPRINI SCORE    AGE RELATED RISK FACTORS                                                             [ ] Age 41-60 years                                            (1 Point)  [ ] Age: 61-74 years                                           (2 Points)                 [X ] Age= 75 years                                                (3 Points)             DISEASE RELATED RISK FACTORS                                                       [ ] Edema in the lower extremities                 (1 Point)                     [ ] Varicose veins                                               (1 Point)                                 [ X] BMI > 25 Kg/m2                                            (1 Point)                                  [ ] Serious infection (ie PNA)                            (1 Point)                     [ ] Lung disease ( COPD, Emphysema)            (1 Point)                                                                          [ ] Acute myocardial infarction                         (1 Point)                  [ ] Congestive heart failure (in the previous month)  (1 Point)         [ ] Inflammatory bowel disease                            (1 Point)                  [ ] Central venous access, PICC or Port               (2 points)       (within the last month)                                                                [ ] Stroke (in the previous month)                        (5 Points)    [ ] Previous or present malignancy                       (2 points)                                                                                                                                                         HEMATOLOGY RELATED FACTORS                                                         [ ] Prior episodes of VTE                                     (3 Points)                     [ ] Positive family history for VTE                      (3 Points)                  [ ] Prothrombin 64775 A                                     (3 Points)                     [ ] Factor V Leiden                                                (3 Points)                        [ ] Lupus anticoagulants                                      (3 Points)                                                           [ ] Anticardiolipin antibodies                              (3 Points)                                                       [ ] High homocysteine in the blood                   (3 Points)                                             [ ] Other congenital or acquired thrombophilia      (3 Points)                                                [ ] Heparin induced thrombocytopenia                  (3 Points)                                        MOBILITY RELATED FACTORS  [ ] Bed rest                                                         (1 Point)  [ ] Plaster cast                                                    (2 points)  [ ] Bed bound for more than 72 hours           (2 Points)    GENDER SPECIFIC FACTORS  [ ] Pregnancy or had a baby within the last month   (1 Point)  [ ] Post-partum < 6 weeks                                   (1 Point)  [ ] Hormonal therapy  or oral contraception   (1 Point)  [ ] History of pregnancy complications              (1 point)  [ ] Unexplained or recurrent              (1 Point)    OTHER RISK FACTORS                                           (1 Point)  [ ] BMI >40, smoking, diabetes requiring insulin, chemotherapy  blood transfusions and length of surgery over 2 hours    SURGERY RELATED RISK FACTORS  [ ]  Section within the last month     (1 Point)  [ ] Minor surgery                                                  (1 Point)  [ ] Arthroscopic surgery                                       (2 Points)  [X] Planned major surgery lasting more            (2 Points)      than 45 minutes     [ ] Elective hip or knee joint replacement       (5 points)       surgery                                                TRAUMA RELATED RISK FACTORS  [ ] Fracture of the hip, pelvis, or leg                       (5 Points)  [ ] Spinal cord injury resulting in paralysis             (5 points)       (in the previous month)    [ ] Paralysis  (less than 1 month)                             (5 Points)  [ ] Multiple Trauma within 1 month                        (5 Points)    Total Score [   6     ]    Caprini Score 0-2: Low Risk, NO VTE prophylaxis required for most patients, encourage ambulation  Caprini Score 3-6: Moderate Risk , pharmacologic VTE prophylaxis is indicated for most patients (in the absence of contraindications)  Caprini Score Greater than or =7: High risk, pharmocologic VTE prophylaxis indicated for most patients (in the absence of contraindications)

## 2025-04-15 NOTE — H&P PST ADULT - HISTORY OF PRESENT ILLNESS
74 year old male with PMH former smoker (1.5 PPD x 11 years; quit in 1979), kidney stones s/p lithotripsy (2020), AVR (2009 with Dr. Orlando) c/b heart block post-op day 2 s/p PPM (implanted 2009), CIELO (2021 with Dr. Orlando), AF s/p cardioversion (few years ago - [facility]) and HTN reports that he had imaging after aortic valve replacement (6/2021) which demonstrated a "3.7 x 4 cm heterogeneous enhancing mass with inferior pole of the right kidney suspicious for renal cell carcinoma." Pt was recently hospitalized at [facility] ED for c/o urinary retention s/p urine marcos catheter. Pt states that it was thought that he possibly had a kidney stone that he had passed and was having hematuria passing clots because he was on Eliquis. As per pt, the eliquis was discontinued by his cardiologist. At this point, pt followed up with his urologist who recommended that he address the mass that was found in 2021 on his right kidney. Pt now presents to PST for scheduled right robotic nephrectomy, cystoscopy dilation, flexible with Dr. Goldberg on 5/6/25. 74 year old male with PMH former smoker (1.5 PPD x 11 years; quit in 1979), kidney stones s/p lithotripsy (2020), AVR (2009 with Dr. Orlando) c/b heart block post-op day 2 s/p PPM (implanted 2009), CIELO (2021 with Dr. Orlando), AF s/p cardioversion (few years ago - [facility]) and HTN reports that he had imaging after aortic valve replacement (6/2021) which demonstrated a "3.7 x 4 cm heterogeneous enhancing mass with inferior pole of the right kidney suspicious for renal cell carcinoma." Pt was recently hospitalized at [facility] ED for c/o urinary retention s/p urine marcos catheter. Pt states that it was thought that he possibly had a kidney stone that he had passed and was having hematuria passing clots because he was on Eliquis. As per pt, the Eliquis was discontinued by his cardiologist. At this point, pt followed up with his urologist who recommended that he address the mass that was found in 2021 on his right kidney. Pt now presents to PST for scheduled right robotic nephrectomy, cystoscopy dilation, flexible with Dr. Goldberg on 5/6/25.

## 2025-04-16 PROBLEM — Z98.890 OTHER SPECIFIED POSTPROCEDURAL STATES: Chronic | Status: INACTIVE | Noted: 2021-06-24 | Resolved: 2025-04-15

## 2025-04-16 LAB
CULTURE RESULTS: NO GROWTH — SIGNIFICANT CHANGE UP
SPECIMEN SOURCE: SIGNIFICANT CHANGE UP

## 2025-05-06 ENCOUNTER — INPATIENT (INPATIENT)
Facility: HOSPITAL | Age: 75
LOS: 1 days | Discharge: HOME | DRG: 700 | End: 2025-05-08
Attending: UROLOGY | Admitting: UROLOGY
Payer: MEDICARE

## 2025-05-06 VITALS
WEIGHT: 199.96 LBS | TEMPERATURE: 98 F | RESPIRATION RATE: 16 BRPM | OXYGEN SATURATION: 97 % | DIASTOLIC BLOOD PRESSURE: 74 MMHG | SYSTOLIC BLOOD PRESSURE: 124 MMHG | HEART RATE: 85 BPM | HEIGHT: 71 IN

## 2025-05-06 DIAGNOSIS — Z95.2 PRESENCE OF PROSTHETIC HEART VALVE: Chronic | ICD-10-CM

## 2025-05-06 DIAGNOSIS — Z95.0 PRESENCE OF CARDIAC PACEMAKER: Chronic | ICD-10-CM

## 2025-05-06 DIAGNOSIS — Z98.890 OTHER SPECIFIED POSTPROCEDURAL STATES: Chronic | ICD-10-CM

## 2025-05-06 DIAGNOSIS — Z96.651 PRESENCE OF RIGHT ARTIFICIAL KNEE JOINT: Chronic | ICD-10-CM

## 2025-05-06 DIAGNOSIS — N28.89 OTHER SPECIFIED DISORDERS OF KIDNEY AND URETER: ICD-10-CM

## 2025-05-06 LAB
ANION GAP SERPL CALC-SCNC: 9 MMOL/L — SIGNIFICANT CHANGE UP (ref 5–17)
CHLORIDE SERPL-SCNC: 96 MMOL/L — SIGNIFICANT CHANGE UP (ref 96–108)
CO2 SERPL-SCNC: 26 MMOL/L — SIGNIFICANT CHANGE UP (ref 22–31)
GAS PNL BLDA: SIGNIFICANT CHANGE UP
POTASSIUM SERPL-MCNC: 5 MMOL/L — SIGNIFICANT CHANGE UP (ref 3.5–5.3)
POTASSIUM SERPL-SCNC: 5 MMOL/L — SIGNIFICANT CHANGE UP (ref 3.5–5.3)
SODIUM SERPL-SCNC: 131 MMOL/L — LOW (ref 135–145)

## 2025-05-06 PROCEDURE — 88312 SPECIAL STAINS GROUP 1: CPT | Mod: 26

## 2025-05-06 PROCEDURE — 88342 IMHCHEM/IMCYTCHM 1ST ANTB: CPT | Mod: 26

## 2025-05-06 PROCEDURE — 88341 IMHCHEM/IMCYTCHM EA ADD ANTB: CPT | Mod: 26

## 2025-05-06 PROCEDURE — 88307 TISSUE EXAM BY PATHOLOGIST: CPT | Mod: 26

## 2025-05-06 DEVICE — XI STAPLER SUREFORM RELOAD 45 WHITE: Type: IMPLANTABLE DEVICE | Site: RIGHT | Status: FUNCTIONAL

## 2025-05-06 DEVICE — VISTASEAL FIBRIN HUMAN 4ML: Type: IMPLANTABLE DEVICE | Site: RIGHT | Status: FUNCTIONAL

## 2025-05-06 DEVICE — LIGATING CLIPS WECK HEMOLOK POLYMER LARGE (PURPLE) 6: Type: IMPLANTABLE DEVICE | Site: RIGHT | Status: FUNCTIONAL

## 2025-05-06 DEVICE — KIT A-LINE 1LUM 20G X 12CM SAFE KIT: Type: IMPLANTABLE DEVICE | Site: RIGHT | Status: FUNCTIONAL

## 2025-05-06 RX ORDER — FENTANYL CITRATE-0.9 % NACL/PF 100MCG/2ML
50 SYRINGE (ML) INTRAVENOUS
Refills: 0 | Status: DISCONTINUED | OUTPATIENT
Start: 2025-05-06 | End: 2025-05-07

## 2025-05-06 RX ORDER — METOPROLOL SUCCINATE 50 MG/1
75 TABLET, EXTENDED RELEASE ORAL
Refills: 0 | Status: DISCONTINUED | OUTPATIENT
Start: 2025-05-06 | End: 2025-05-08

## 2025-05-06 RX ORDER — SENNA 187 MG
2 TABLET ORAL AT BEDTIME
Refills: 0 | Status: DISCONTINUED | OUTPATIENT
Start: 2025-05-06 | End: 2025-05-07

## 2025-05-06 RX ORDER — HYDROMORPHONE/SOD CHLOR,ISO/PF 2 MG/10 ML
0.5 SYRINGE (ML) INJECTION
Refills: 0 | Status: DISCONTINUED | OUTPATIENT
Start: 2025-05-06 | End: 2025-05-07

## 2025-05-06 RX ORDER — HEPARIN SODIUM 1000 [USP'U]/ML
5000 INJECTION INTRAVENOUS; SUBCUTANEOUS EVERY 8 HOURS
Refills: 0 | Status: DISCONTINUED | OUTPATIENT
Start: 2025-05-06 | End: 2025-05-08

## 2025-05-06 RX ORDER — SODIUM CHLORIDE 9 G/1000ML
1000 INJECTION, SOLUTION INTRAVENOUS
Refills: 0 | Status: DISCONTINUED | OUTPATIENT
Start: 2025-05-06 | End: 2025-05-07

## 2025-05-06 RX ORDER — AMLODIPINE BESYLATE 10 MG/1
5 TABLET ORAL AT BEDTIME
Refills: 0 | Status: DISCONTINUED | OUTPATIENT
Start: 2025-05-06 | End: 2025-05-08

## 2025-05-06 RX ORDER — ACETAMINOPHEN 500 MG/5ML
1000 LIQUID (ML) ORAL EVERY 8 HOURS
Refills: 0 | Status: COMPLETED | OUTPATIENT
Start: 2025-05-06 | End: 2025-05-07

## 2025-05-06 RX ORDER — TAMSULOSIN HYDROCHLORIDE 0.4 MG/1
0.4 CAPSULE ORAL AT BEDTIME
Refills: 0 | Status: DISCONTINUED | OUTPATIENT
Start: 2025-05-06 | End: 2025-05-08

## 2025-05-06 RX ORDER — SODIUM CHLORIDE 9 G/1000ML
1000 INJECTION, SOLUTION INTRAVENOUS
Refills: 0 | Status: DISCONTINUED | OUTPATIENT
Start: 2025-05-06 | End: 2025-05-06

## 2025-05-06 RX ORDER — OXYCODONE HYDROCHLORIDE 30 MG/1
10 TABLET ORAL EVERY 6 HOURS
Refills: 0 | Status: DISCONTINUED | OUTPATIENT
Start: 2025-05-06 | End: 2025-05-08

## 2025-05-06 RX ORDER — CEFAZOLIN SODIUM IN 0.9 % NACL 3 G/100 ML
1000 INTRAVENOUS SOLUTION, PIGGYBACK (ML) INTRAVENOUS EVERY 8 HOURS
Refills: 0 | Status: DISCONTINUED | OUTPATIENT
Start: 2025-05-06 | End: 2025-05-07

## 2025-05-06 RX ORDER — ACETAMINOPHEN 500 MG/5ML
1000 LIQUID (ML) ORAL ONCE
Refills: 0 | Status: DISCONTINUED | OUTPATIENT
Start: 2025-05-06 | End: 2025-05-06

## 2025-05-06 RX ORDER — OXYCODONE HYDROCHLORIDE 30 MG/1
5 TABLET ORAL EVERY 6 HOURS
Refills: 0 | Status: DISCONTINUED | OUTPATIENT
Start: 2025-05-06 | End: 2025-05-08

## 2025-05-06 RX ADMIN — Medication 1000 MILLIGRAM(S): at 22:10

## 2025-05-06 RX ADMIN — Medication 400 MILLIGRAM(S): at 22:01

## 2025-05-06 RX ADMIN — HEPARIN SODIUM 5000 UNIT(S): 1000 INJECTION INTRAVENOUS; SUBCUTANEOUS at 22:01

## 2025-05-06 RX ADMIN — SODIUM CHLORIDE 100 MILLILITER(S): 9 INJECTION, SOLUTION INTRAVENOUS at 20:47

## 2025-05-06 RX ADMIN — TAMSULOSIN HYDROCHLORIDE 0.4 MILLIGRAM(S): 0.4 CAPSULE ORAL at 22:01

## 2025-05-06 RX ADMIN — Medication 100 MILLIGRAM(S): at 22:19

## 2025-05-06 NOTE — PATIENT PROFILE ADULT - FUNCTIONAL ASSESSMENT - BASIC MOBILITY 3.
Body mass index is 39.98 kg/m².   Dietary/lifestyle modification     4 = No assist / stand by assistance

## 2025-05-06 NOTE — PRE-ANESTHESIA EVALUATION ADULT - NSANTHAIRWAYFT_ENT_ALL_CORE
extremely poor dentition, multiple missing teeth, dental decay and dental remnants present.  Pt states he needs remaining teeth removed.  Denies any loose teeth

## 2025-05-06 NOTE — PROGRESS NOTE ADULT - ASSESSMENT
A/P: 74y Male s/p R robotic radical nephrectomy  Ancef while admitted  DVT prophylaxis/OOB  Incentive spirometry  Strict I&O's  Analgesia and antiemetics as needed  CLD/IVF/Advance with flatus  AM labs  AM TOV

## 2025-05-06 NOTE — PRE-OP CHECKLIST - NEEDLE GAUGE
[de-identified] : I discussed the underlying pathophysiology of the patient's condition in great detail with the patient. I went over the patient's x-rays with them in great detail. We discussed the use of ice, Tylenol and anti-inflammatories to relieve pain. The patient was instructed in ROM exercises they are to do at home. At-home strengthening, and stretching exercises were discussed and demonstrated with the patient. We went over the wide ranging benefits of exercise for the patient health and I encouraged her to begin a diligent exercise program. She needs to avoid high-impact activities such as running and jumping. I recommend alternative activities such as riding a stationary bike on low tension, or the elliptical. She should focus on light weight and high repetition exercises. She should avoid squatting, and kneeling. At this time, she will start a course of physical therapy for strengthening and flexibility. A prescription for physical therapy was provided. All of her questions were answered. She understands and consents to the plan. \par \par FU 1 month.\par after undergoing PT for her lower back and legs. 20

## 2025-05-06 NOTE — PROGRESS NOTE ADULT - SUBJECTIVE AND OBJECTIVE BOX
Post op Check    Pt seen and examined without complaints. Pain is controlled. Denies SOB/CP/N/V. Still feeling tired from anesthesia    Vital Signs Last 24 Hrs  T(C): 36.9 (06 May 2025 19:02), Max: 36.9 (06 May 2025 19:02)  T(F): 98.4 (06 May 2025 19:02), Max: 98.4 (06 May 2025 19:02)  HR: 79 (06 May 2025 20:30) (68 - 85)  BP: 131/75 (06 May 2025 19:20) (124/74 - 141/63)  BP(mean): 96 (06 May 2025 19:20) (88 - 96)  RR: 16 (06 May 2025 20:30) (16 - 17)  SpO2: 100% (06 May 2025 20:30) (93% - 100%)    Parameters below as of 06 May 2025 20:30  Patient On (Oxygen Delivery Method): mask, nonrebreather        I&O's Summary    06 May 2025 07:01  -  06 May 2025 21:08  --------------------------------------------------------  IN: 100 mL / OUT: 30 mL / NET: 70 mL        Physical Exam  Gen: NAD  Pulm: No respiratory distress, no subcostal retractions  Abd: Soft, NT, mild distension. incisions c/d/i  : marcos in place with peach colored urine

## 2025-05-07 LAB
ANION GAP SERPL CALC-SCNC: 10 MMOL/L — SIGNIFICANT CHANGE UP (ref 5–17)
BASOPHILS # BLD AUTO: 0.02 K/UL — SIGNIFICANT CHANGE UP (ref 0–0.2)
BASOPHILS NFR BLD AUTO: 0.2 % — SIGNIFICANT CHANGE UP (ref 0–2)
BUN SERPL-MCNC: 19 MG/DL — SIGNIFICANT CHANGE UP (ref 7–23)
CALCIUM SERPL-MCNC: 8.7 MG/DL — SIGNIFICANT CHANGE UP (ref 8.4–10.5)
CHLORIDE SERPL-SCNC: 93 MMOL/L — LOW (ref 96–108)
CO2 SERPL-SCNC: 23 MMOL/L — SIGNIFICANT CHANGE UP (ref 22–31)
CREAT SERPL-MCNC: 0.77 MG/DL — SIGNIFICANT CHANGE UP (ref 0.5–1.3)
EGFR: 93 ML/MIN/1.73M2 — SIGNIFICANT CHANGE UP
EGFR: 93 ML/MIN/1.73M2 — SIGNIFICANT CHANGE UP
EOSINOPHIL # BLD AUTO: 0 K/UL — SIGNIFICANT CHANGE UP (ref 0–0.5)
EOSINOPHIL NFR BLD AUTO: 0 % — SIGNIFICANT CHANGE UP (ref 0–6)
GLUCOSE SERPL-MCNC: 250 MG/DL — HIGH (ref 70–99)
HCT VFR BLD CALC: 30.9 % — LOW (ref 39–50)
HGB BLD-MCNC: 9.6 G/DL — LOW (ref 13–17)
IMM GRANULOCYTES NFR BLD AUTO: 0.5 % — SIGNIFICANT CHANGE UP (ref 0–0.9)
LYMPHOCYTES # BLD AUTO: 0.68 K/UL — LOW (ref 1–3.3)
LYMPHOCYTES # BLD AUTO: 6.1 % — LOW (ref 13–44)
MCHC RBC-ENTMCNC: 24 PG — LOW (ref 27–34)
MCHC RBC-ENTMCNC: 31.1 G/DL — LOW (ref 32–36)
MCV RBC AUTO: 77.3 FL — LOW (ref 80–100)
MONOCYTES # BLD AUTO: 0.75 K/UL — SIGNIFICANT CHANGE UP (ref 0–0.9)
MONOCYTES NFR BLD AUTO: 6.8 % — SIGNIFICANT CHANGE UP (ref 2–14)
NEUTROPHILS # BLD AUTO: 9.6 K/UL — HIGH (ref 1.8–7.4)
NEUTROPHILS NFR BLD AUTO: 86.4 % — HIGH (ref 43–77)
NRBC BLD AUTO-RTO: 0 /100 WBCS — SIGNIFICANT CHANGE UP (ref 0–0)
PLATELET # BLD AUTO: 251 K/UL — SIGNIFICANT CHANGE UP (ref 150–400)
POTASSIUM SERPL-MCNC: 4.6 MMOL/L — SIGNIFICANT CHANGE UP (ref 3.5–5.3)
POTASSIUM SERPL-SCNC: 4.6 MMOL/L — SIGNIFICANT CHANGE UP (ref 3.5–5.3)
RBC # BLD: 4 M/UL — LOW (ref 4.2–5.8)
RBC # FLD: 17.1 % — HIGH (ref 10.3–14.5)
SODIUM SERPL-SCNC: 126 MMOL/L — LOW (ref 135–145)
WBC # BLD: 11.11 K/UL — HIGH (ref 3.8–10.5)
WBC # FLD AUTO: 11.11 K/UL — HIGH (ref 3.8–10.5)

## 2025-05-07 PROCEDURE — 99221 1ST HOSP IP/OBS SF/LOW 40: CPT

## 2025-05-07 RX ORDER — BISACODYL 5 MG
10 TABLET, DELAYED RELEASE (ENTERIC COATED) ORAL ONCE
Refills: 0 | Status: DISCONTINUED | OUTPATIENT
Start: 2025-05-07 | End: 2025-05-08

## 2025-05-07 RX ORDER — SENNA 187 MG
2 TABLET ORAL AT BEDTIME
Refills: 0 | Status: DISCONTINUED | OUTPATIENT
Start: 2025-05-07 | End: 2025-05-08

## 2025-05-07 RX ORDER — BISACODYL 5 MG
10 TABLET, DELAYED RELEASE (ENTERIC COATED) ORAL ONCE
Refills: 0 | Status: COMPLETED | OUTPATIENT
Start: 2025-05-08 | End: 2025-05-08

## 2025-05-07 RX ORDER — ACETAMINOPHEN 500 MG/5ML
2 LIQUID (ML) ORAL
Qty: 0 | Refills: 0 | DISCHARGE

## 2025-05-07 RX ORDER — SENNA 187 MG
2 TABLET ORAL
Qty: 0 | Refills: 0 | DISCHARGE
Start: 2025-05-07

## 2025-05-07 RX ADMIN — METOPROLOL SUCCINATE 75 MILLIGRAM(S): 50 TABLET, EXTENDED RELEASE ORAL at 17:58

## 2025-05-07 RX ADMIN — SODIUM CHLORIDE 100 MILLILITER(S): 9 INJECTION, SOLUTION INTRAVENOUS at 08:36

## 2025-05-07 RX ADMIN — Medication 400 MILLIGRAM(S): at 15:00

## 2025-05-07 RX ADMIN — HEPARIN SODIUM 5000 UNIT(S): 1000 INJECTION INTRAVENOUS; SUBCUTANEOUS at 21:24

## 2025-05-07 RX ADMIN — Medication 400 MILLIGRAM(S): at 05:08

## 2025-05-07 RX ADMIN — Medication 100 MILLIGRAM(S): at 15:00

## 2025-05-07 RX ADMIN — HEPARIN SODIUM 5000 UNIT(S): 1000 INJECTION INTRAVENOUS; SUBCUTANEOUS at 05:08

## 2025-05-07 RX ADMIN — AMLODIPINE BESYLATE 5 MILLIGRAM(S): 10 TABLET ORAL at 21:24

## 2025-05-07 RX ADMIN — METOPROLOL SUCCINATE 75 MILLIGRAM(S): 50 TABLET, EXTENDED RELEASE ORAL at 05:09

## 2025-05-07 RX ADMIN — Medication 20 MILLIGRAM(S): at 08:41

## 2025-05-07 RX ADMIN — Medication 2 TABLET(S): at 21:24

## 2025-05-07 RX ADMIN — Medication 1000 MILLIGRAM(S): at 21:54

## 2025-05-07 RX ADMIN — Medication 400 MILLIGRAM(S): at 21:24

## 2025-05-07 RX ADMIN — Medication 100 MILLIGRAM(S): at 05:08

## 2025-05-07 RX ADMIN — HEPARIN SODIUM 5000 UNIT(S): 1000 INJECTION INTRAVENOUS; SUBCUTANEOUS at 17:55

## 2025-05-07 RX ADMIN — Medication 1000 MILLIGRAM(S): at 16:00

## 2025-05-07 RX ADMIN — TAMSULOSIN HYDROCHLORIDE 0.4 MILLIGRAM(S): 0.4 CAPSULE ORAL at 21:24

## 2025-05-07 RX ADMIN — Medication 1000 MILLIGRAM(S): at 05:38

## 2025-05-07 NOTE — CONSULT NOTE ADULT - ASSESSMENT
A/P:74 year old male with PMH former smoker (1.5 PPD x 11 years; quit in 1979), kidney stones s/p lithotripsy (2020), AVR (2009 with Dr. Orlando) c/b heart block post-op day 2 s/p PPM (implanted 2009), CIELO (2021 with Dr. Orlando), AF s/p cardioversion (few years ago - [facility]) and HTN reports that he had imaging after aortic valve replacement (6/2021) which demonstrated a "3.7 x 4 cm heterogeneous enhancing mass with inferior pole of the right kidney suspicious for renal cell carcinoma." Pt was recently hospitalized at [facility] ED for c/o urinary retention s/p urine marcos catheter. Pt states that it was thought that he possibly had a kidney stone that he had passed and was having hematuria passing clots because he was on Eliquis. As per pt, the Eliquis was discontinued by his cardiologist. At this point, pt followed up with his urologist who recommended that he address the mass that was found in 2021 on his right kidney. Pt now presents to PST for scheduled right robotic nephrectomy, cystoscopy dilation, flexible with Dr. Goldberg on 5/6/25.     Wound Consult requested to assist w/ management of  Prophylactic treatment    Recommendations:       Moisturize intact skin w/ SWEEN cream BID  Nutrition Consult for optimization in pt w/ Increased nutritional needs  Continue turning and positioning w/ offloading assistive devices as per protocol  Buttocks/ Sacrum  cavilon daily Rashaad BID and prn soiling        Continue w/ attends under pads and Pericare as per protocol  Waffle Cushion to chair when oob to chair  Heels appropriately offloaded with positioners  Continue w/ low air loss pressure redistribution bed surface     Care as per medicine, will remain available as requested  If needed upon discharge f/u as outpatient at [facility] [address] [facility] [phone]  Seen and D/w team & RN  Thank you for this consult,  CHARLEY Mccann-BC, [facility]  [phone]  Nights/ Weekends/ Holidays please call:  General Surgery Consult pager ([phone]) for emergencies  Wound PT for multilayer leg wrapping or VAC issues ([phone])

## 2025-05-07 NOTE — DISCHARGE NOTE PROVIDER - PROVIDER TOKENS
PROVIDER:[TOKEN:[[id number]],FOLLOWUP:[1 week]] PROVIDER:[TOKEN:[[id number]],FOLLOWUP:[2 weeks],ESTABLISHEDPATIENT:[T]]

## 2025-05-07 NOTE — PROGRESS NOTE ADULT - ASSESSMENT
A/P: 74y Male s/p R robotic radical nephrectomy, POD#1    -AM labs  -marcos removed this AM - PVR  -continue ancef  -clear until flatus, f/u GI function  -Analgesia and antiemetics as needed  -OOB/DVT ppx/IS A/P: 74y Male s/p R robotic radical nephrectomy, POD#1    -AM labs  -marcos removed this AM - PVR  -continue ancef  -advance to regular diet, f/u GI function  -Analgesia and antiemetics as needed  -OOB/DVT ppx/IS

## 2025-05-07 NOTE — PROGRESS NOTE ADULT - ASSESSMENT
M s/p nephrectomy    -Trial of void this AM---marcos has been removed  -ambulation as permitted  -SCDs for DVT ppx  -hydration  -advance diet as tolerated  -pain control  -dispo home today if reaches discharge milestones

## 2025-05-07 NOTE — DISCHARGE NOTE PROVIDER - NSDCCPTREATMENT_GEN_ALL_CORE_FT
PRINCIPAL PROCEDURE  Procedure: Robot-assisted radical nephrectomy  Findings and Treatment:      PRINCIPAL PROCEDURE  Procedure: Robot-assisted radical nephrectomy  Findings and Treatment: right side

## 2025-05-07 NOTE — DISCHARGE NOTE PROVIDER - CARE PROVIDER_API CALL
Goldberg, Gary David  Urology  [address] [place] [address], [address]  [place] [address]  Phone: [phone]  Fax: [phone]  Follow Up Time: 1 week   Goldberg, Gary David  Urology  [address] [place] [address], [address]  [place] [address]  Phone: [phone]  Fax: [phone]  Established Patient  Follow Up Time: 2 weeks

## 2025-05-07 NOTE — DISCHARGE NOTE PROVIDER - NSDCMRMEDTOKEN_GEN_ALL_CORE_FT
amLODIPine 5 mg oral tablet: 1 tab(s) orally once a day (at bedtime)  Flomax 0.4 mg oral capsule: 1 cap(s) orally once a day (at bedtime)  metoprolol succinate 25 mg oral tablet, extended release: 3 tab(s) orally 2 times a day 3 tab(s) (total 75 mg) orally 2 times a day  senna leaf extract oral tablet: 2 tab(s) orally once a day (at bedtime) As needed Constipation  Tylenol 325 mg oral tablet: 2 tab(s) orally every 6 hours as needed for  mild pain

## 2025-05-07 NOTE — CONSULT NOTE ADULT - SUBJECTIVE AND OBJECTIVE BOX
Wound SURGERY CONSULT NOTE    HPI:  74 year old male with PMH former smoker (1.5 PPD x 11 years; quit in 1979), kidney stones s/p lithotripsy (2020), AVR (2009 with Dr. Orlando) c/b heart block post-op day 2 s/p PPM (implanted 2009), CIELO (2021 with Dr. Orlando), AF s/p cardioversion (few years ago - [facility]) and HTN reports that he had imaging after aortic valve replacement (6/2021) which demonstrated a "3.7 x 4 cm heterogeneous enhancing mass with inferior pole of the right kidney suspicious for renal cell carcinoma." Pt was recently hospitalized at [facility] ED for c/o urinary retention s/p urine marcos catheter. Pt states that it was thought that he possibly had a kidney stone that he had passed and was having hematuria passing clots because he was on Eliquis. As per pt, the Eliquis was discontinued by his cardiologist. At this point, pt followed up with his urologist who recommended that he address the mass that was found in 2021 on his right kidney. Pt now presents to [facility] for scheduled right robotic nephrectomy, cystoscopy dilation, flexible with Dr. Goldberg on 5/6/25. (15 Apr 2025 13:52)    Wound consult requested by team to assist w/ management of skin injury. Patient found sitting in chair with waffle cushion for protection. I introduced myself and my role.  Pt w/o c/o pain, drainage, odor, color change,  or worsening swelling. Offloading and pericare initiated upon admission as pt Increasingly sedentary 2/2 to illness. Pt is continent of urine & stool.  All questions asked and answered to pt's expressed understanding and satisfaction. Safety maintained throuout consult.     Current Diet: Diet, Regular (05-07-25 @ 09:03)      PAST MEDICAL & SURGICAL HISTORY:  Afib      Hypertension      Mitral insufficiency      History of BPH      Other specified disorders of kidney and ureter      Kidney stones      Former smoker      H/O aortic valve repair  2009      History of cardioversion  5/26/21 [facility] by Dr. Workman      Pacemaker  2009, 2017      Status post right knee replacement  2015      H/O lithotripsy  2/2020      History of transcatheter aortic valve implantation (CIELO)      S/P release of urethral stricture  due to kidney stone which traveled to urethra s/p lithotripsy 2/2020      History of hand surgery      REVIEW OF SYSTEMS:   General/ Breast/ Skin/Vasc/ Neuro/ MSK: see HPI  All other systems negative    MEDICATIONS  (STANDING):  acetaminophen   IVPB .. 1000 milliGRAM(s) IV Intermittent every 8 hours  amLODIPine   Tablet 5 milliGRAM(s) Oral at bedtime  bisacodyl Suppository 10 milliGRAM(s) Rectal once  ceFAZolin   IVPB 1000 milliGRAM(s) IV Intermittent every 8 hours  chlorhexidine 2% Cloths 1 Application(s) Topical once  famotidine    Tablet 20 milliGRAM(s) Oral daily  heparin   Injectable 5000 Unit(s) SubCutaneous every 8 hours  lactated ringers. 1000 milliLiter(s) (100 mL/Hr) IV Continuous <Continuous>  metoprolol succinate ER 75 milliGRAM(s) Oral two times a day  tamsulosin 0.4 milliGRAM(s) Oral at bedtime    MEDICATIONS  (PRN):  oxyCODONE    IR 5 milliGRAM(s) Oral every 6 hours PRN Moderate Pain (4 - 6)  oxyCODONE    IR 10 milliGRAM(s) Oral every 6 hours PRN Severe Pain (7 - 10)  senna 2 Tablet(s) Oral at bedtime PRN Constipation      Allergies    No Known Allergies    Intolerances        SOCIAL HISTORY:       Former smoker, no  hx of ETOH, drugs    FAMILY HISTORY:    No pertinent family hx among first degree relatives.    PHYSICAL EXAM:  Vital Signs Last 24 Hrs  T(C): 36.6 (07 May 2025 14:12), Max: 37 (07 May 2025 03:30)  T(F): 97.9 (07 May 2025 14:12), Max: 98.6 (07 May 2025 03:30)  HR: 89 (07 May 2025 14:12) (59 - 89)  BP: 107/72 (07 May 2025 14:12) (107/72 - 141/63)  BP(mean): 88 (07 May 2025 00:30) (88 - 96)  RR: 18 (07 May 2025 14:12) (16 - 18)  SpO2: 98% (07 May 2025 15:53) (92% - 100%)    Parameters below as of 07 May 2025 14:12  Patient On (Oxygen Delivery Method): room air        NAD/  A&Ox3/ WD/ WN  Versa Care P500 bed  HEENT:  sclera clear, mucosa moist, throat clear, trachea midline, neck supple  Respiratory: nonlabored w/ equal chest rise  Gastrointestinal: soft NT/ND   : Deferred  Neurology: verbal,  follows commands  Psych: calm/ appropriate  Musculoskeletal:  FROM, no deformities/ contractures  Vascular: BLE equally warm,  no cyanosis, clubbing, edema nor acute ischemia  Skin:  moist w/ good turgor      LABS/ CULTURES/ RADIOLOGY:                        9.6    11.11 )-----------( 251      ( 07 May 2025 07:11 )             30.9       126  |  93  |  19  ----------------------------<  250      [05-07-25 @ 07:16]  4.6   |  23  |  0.77        Ca     8.7     [05-07-25 @ 07:16]

## 2025-05-07 NOTE — DISCHARGE NOTE PROVIDER - NSDCCPCAREPLAN_GEN_ALL_CORE_FT
PRINCIPAL DISCHARGE DIAGNOSIS  Diagnosis: Renal cell carcinoma  Assessment and Plan of Treatment: You had your right kidney removed.  Take tylenol as need for pain control and avoid ibuprofen or other nephrotoxic medications.  Continue regular diet and ambulate daily to help with recovery.  Take stool softeners to help with bowel movements and follow up with Dr Goldberg in 1 week.  Notify the office if you develop any fevers of 100.8F+ or intractable pain/nausea/vomiting.      SECONDARY DISCHARGE DIAGNOSES  Diagnosis: Afib  Assessment and Plan of Treatment: You may resume eliquis this Friday, 5/9/25.  Follow up routinely with your cardiologist and continue your other heart meds.    Diagnosis: HTN (hypertension)  Assessment and Plan of Treatment: Take your blood pressure medications daily and follow up routinely with your primary care doctor.     PRINCIPAL DISCHARGE DIAGNOSIS  Diagnosis: Renal cell carcinoma  Assessment and Plan of Treatment: You had your right kidney removed.  Take tylenol as need for pain control and avoid ibuprofen or other nephrotoxic medications.  Continue regular diet and ambulate daily to help with recovery.  Take stool softeners to help with bowel movements and follow up with Dr Goldberg in 1 week.  Notify the office if you develop any fevers of 100.8F+ or intractable pain/nausea/vomiting.      SECONDARY DISCHARGE DIAGNOSES  Diagnosis: Afib  Assessment and Plan of Treatment: You may resume eliquis should your cardioligist advise no sooner than 5/9.  Follow up with your cardiologist and continue your other heart meds.    Diagnosis: HTN (hypertension)  Assessment and Plan of Treatment: Take your blood pressure medications daily and follow up routinely with your primary care doctor.

## 2025-05-07 NOTE — DISCHARGE NOTE PROVIDER - CARE PROVIDERS DIRECT ADDRESSES
[email or web address]

## 2025-05-07 NOTE — PROGRESS NOTE ADULT - SUBJECTIVE AND OBJECTIVE BOX
The patient was seen and examined at bedside.  No acute events overnight and the patient is without acute complaints this AM.  Pt's marcos catheter was removed this AM.  Denies having flatus.    T(C): 35.3 (05-07-25 @ 04:20), Max: 37 (05-07-25 @ 03:30)  HR: 60 (05-07-25 @ 05:55) (59 - 85)  BP: 113/72 (05-07-25 @ 04:20) (108/60 - 141/63)  RR: 18 (05-07-25 @ 04:20) (16 - 18)  SpO2: 96% (05-07-25 @ 05:55) (92% - 100%)  Wt(kg): --    Physical Exam:    General: NAD, A+Ox3  Abdomen: soft, non-tender, non-distended        05-06 @ 07:01  -  05-07 @ 07:00  --------------------------------------------------------  IN: 1390 mL / OUT: 555 mL / NET: 835 mL      F - 550cc clear   The patient was seen and examined at bedside.  No acute events overnight and the patient is without acute complaints this AM.  Pt's marcos catheter was removed this AM.  Denies having flatus.    T(C): 35.3 (05-07-25 @ 04:20), Max: 37 (05-07-25 @ 03:30)  HR: 60 (05-07-25 @ 05:55) (59 - 85)  BP: 113/72 (05-07-25 @ 04:20) (108/60 - 141/63)  RR: 18 (05-07-25 @ 04:20) (16 - 18)  SpO2: 96% (05-07-25 @ 05:55) (92% - 100%)  Wt(kg): --    Physical Exam:    General: NAD, A+Ox3  Abdomen: soft, non-tender, non-distended; all incision sites are clean and dry        05-06 @ 07:01  -  05-07 @ 07:00  --------------------------------------------------------  IN: 1390 mL / OUT: 555 mL / NET: 835 mL      F - 550cc clear   The patient was seen and examined at bedside.  No acute events overnight and the patient is without acute complaints this AM.  Pt's marcos catheter was removed this AM.  Denies having flatus.  Of note, his temp dropped to 95.6F this AM but was repeated - 97.4F currently.    T(C): 35.3 (05-07-25 @ 04:20), Max: 37 (05-07-25 @ 03:30)  HR: 60 (05-07-25 @ 05:55) (59 - 85)  BP: 113/72 (05-07-25 @ 04:20) (108/60 - 141/63)  RR: 18 (05-07-25 @ 04:20) (16 - 18)  SpO2: 96% (05-07-25 @ 05:55) (92% - 100%)  Wt(kg): --    Physical Exam:    General: NAD, A+Ox3  Abdomen: soft, non-tender, non-distended; all incision sites are clean and dry        05-06 @ 07:01  -  05-07 @ 07:00  --------------------------------------------------------  IN: 1390 mL / OUT: 555 mL / NET: 835 mL      F - 550cc clear

## 2025-05-07 NOTE — PROGRESS NOTE ADULT - SUBJECTIVE AND OBJECTIVE BOX
Urology Progress Note      Subjective:    marcos removed this AM          Physical Exam:      no distress  incisions clean dry intact

## 2025-05-07 NOTE — DISCHARGE NOTE PROVIDER - HOSPITAL COURSE
This is a 74 year old male who underwent a scheduled robotic right nephrectomy.  he remained hemodynamically stable afterwards and did not require any blood products.  POD#1 his marcos catheter was removed and he was able to void without retention.  He was tolerating clears.    Prior to discharge, the patient was ambulating, tolerating clears, and pain controlled.  He was instructed to have regular food when he has gas. This is a 74 year old male who underwent a scheduled robotic right nephrectomy.  he remained hemodynamically stable afterwards and did not require any blood products.  POD#1 his marcos catheter was removed and he was able to void without retention.  He was tolerating clears and advanced to regular diet, which he also tolerated.    Prior to discharge, the patient was ambulating, tolerating regular diet, and pain controlled.   This is a 74 year old male who underwent a scheduled robotic right nephrectomy.  he remained hemodynamically stable afterwards and did not require any blood products.  POD#1 his marcos catheter was removed and he was able to void without retention.  He was tolerating clears and advanced to regular diet, which he also tolerated.    Prior to discharge, the patient was ambulating, voiding, tolerating regular diet, afebrile and pain controlled.

## 2025-05-08 ENCOUNTER — TRANSCRIPTION ENCOUNTER (OUTPATIENT)
Age: 75
End: 2025-05-08

## 2025-05-08 VITALS — HEART RATE: 90 BPM | OXYGEN SATURATION: 95 %

## 2025-05-08 LAB
ANION GAP SERPL CALC-SCNC: 10 MMOL/L — SIGNIFICANT CHANGE UP (ref 5–17)
BUN SERPL-MCNC: 14 MG/DL — SIGNIFICANT CHANGE UP (ref 7–23)
CALCIUM SERPL-MCNC: 8.9 MG/DL — SIGNIFICANT CHANGE UP (ref 8.4–10.5)
CHLORIDE SERPL-SCNC: 97 MMOL/L — SIGNIFICANT CHANGE UP (ref 96–108)
CO2 SERPL-SCNC: 28 MMOL/L — SIGNIFICANT CHANGE UP (ref 22–31)
CREAT SERPL-MCNC: 1.14 MG/DL — SIGNIFICANT CHANGE UP (ref 0.5–1.3)
EGFR: 67 ML/MIN/1.73M2 — SIGNIFICANT CHANGE UP
EGFR: 67 ML/MIN/1.73M2 — SIGNIFICANT CHANGE UP
GLUCOSE SERPL-MCNC: 126 MG/DL — HIGH (ref 70–99)
HCT VFR BLD CALC: 33 % — LOW (ref 39–50)
HGB BLD-MCNC: 9.8 G/DL — LOW (ref 13–17)
MCHC RBC-ENTMCNC: 23.3 PG — LOW (ref 27–34)
MCHC RBC-ENTMCNC: 29.7 G/DL — LOW (ref 32–36)
MCV RBC AUTO: 78.4 FL — LOW (ref 80–100)
NRBC BLD AUTO-RTO: 0 /100 WBCS — SIGNIFICANT CHANGE UP (ref 0–0)
PLATELET # BLD AUTO: 282 K/UL — SIGNIFICANT CHANGE UP (ref 150–400)
POTASSIUM SERPL-MCNC: 4 MMOL/L — SIGNIFICANT CHANGE UP (ref 3.5–5.3)
POTASSIUM SERPL-SCNC: 4 MMOL/L — SIGNIFICANT CHANGE UP (ref 3.5–5.3)
RBC # BLD: 4.21 M/UL — SIGNIFICANT CHANGE UP (ref 4.2–5.8)
RBC # FLD: 17.4 % — HIGH (ref 10.3–14.5)
SODIUM SERPL-SCNC: 135 MMOL/L — SIGNIFICANT CHANGE UP (ref 135–145)
WBC # BLD: 9.03 K/UL — SIGNIFICANT CHANGE UP (ref 3.8–10.5)
WBC # FLD AUTO: 9.03 K/UL — SIGNIFICANT CHANGE UP (ref 3.8–10.5)

## 2025-05-08 PROCEDURE — 88307 TISSUE EXAM BY PATHOLOGIST: CPT

## 2025-05-08 PROCEDURE — 85027 COMPLETE CBC AUTOMATED: CPT

## 2025-05-08 PROCEDURE — 94660 CPAP INITIATION&MGMT: CPT

## 2025-05-08 PROCEDURE — C1769: CPT

## 2025-05-08 PROCEDURE — 88342 IMHCHEM/IMCYTCHM 1ST ANTB: CPT

## 2025-05-08 PROCEDURE — 88312 SPECIAL STAINS GROUP 1: CPT

## 2025-05-08 PROCEDURE — C1889: CPT

## 2025-05-08 PROCEDURE — 82947 ASSAY GLUCOSE BLOOD QUANT: CPT

## 2025-05-08 PROCEDURE — C9399: CPT

## 2025-05-08 PROCEDURE — 80048 BASIC METABOLIC PNL TOTAL CA: CPT

## 2025-05-08 PROCEDURE — 80051 ELECTROLYTE PANEL: CPT

## 2025-05-08 PROCEDURE — 85014 HEMATOCRIT: CPT

## 2025-05-08 PROCEDURE — 88341 IMHCHEM/IMCYTCHM EA ADD ANTB: CPT

## 2025-05-08 PROCEDURE — 84132 ASSAY OF SERUM POTASSIUM: CPT

## 2025-05-08 PROCEDURE — 82435 ASSAY OF BLOOD CHLORIDE: CPT

## 2025-05-08 PROCEDURE — S2900: CPT

## 2025-05-08 PROCEDURE — 85018 HEMOGLOBIN: CPT

## 2025-05-08 PROCEDURE — 83605 ASSAY OF LACTIC ACID: CPT

## 2025-05-08 PROCEDURE — 82803 BLOOD GASES ANY COMBINATION: CPT

## 2025-05-08 PROCEDURE — 85025 COMPLETE CBC W/AUTO DIFF WBC: CPT

## 2025-05-08 PROCEDURE — 82330 ASSAY OF CALCIUM: CPT

## 2025-05-08 PROCEDURE — 84295 ASSAY OF SERUM SODIUM: CPT

## 2025-05-08 RX ADMIN — METOPROLOL SUCCINATE 75 MILLIGRAM(S): 50 TABLET, EXTENDED RELEASE ORAL at 05:11

## 2025-05-08 RX ADMIN — HEPARIN SODIUM 5000 UNIT(S): 1000 INJECTION INTRAVENOUS; SUBCUTANEOUS at 05:11

## 2025-05-08 NOTE — DISCHARGE NOTE NURSING/CASE MANAGEMENT/SOCIAL WORK - FINANCIAL ASSISTANCE
[facility] provides services at a reduced cost to those who are determined to be eligible through [facility]’s financial assistance program. Information regarding [facility]’s financial assistance program can be found by going to [email or web address] or by calling [phone].

## 2025-05-08 NOTE — PROGRESS NOTE ADULT - ASSESSMENT
M s/p nephrectomy    -marcos is out  -advance diet as tolerated  -pain control  -ambulation  -scds  -hopefully home today  -outpt gu followup with dr. goldberg for pathology review

## 2025-05-08 NOTE — PROGRESS NOTE ADULT - SUBJECTIVE AND OBJECTIVE BOX
UROLOGY DAILY PROGRESS NOTE:     Subjective: Patient seen and examined at bedside. No overnight events. Has not had flatus/BM yet.       Objective:  Vital signs  T(F): , Max: 98.5 (05-07-25 @ 20:59)  HR: 73 (05-08-25 @ 06:11)  BP: 120/76 (05-08-25 @ 06:11)  SpO2: 93% (05-08-25 @ 06:11)  Wt(kg): --    I&O's Summary    06 May 2025 07:01  -  07 May 2025 07:00  --------------------------------------------------------  IN: 1390 mL / OUT: 555 mL / NET: 835 mL    07 May 2025 07:01  -  08 May 2025 06:40  --------------------------------------------------------  IN: 1360 mL / OUT: 1850 mL / NET: -490 mL        Gen: NAD  Pulm: No respiratory distress, no subcostal retractions  Abd: Soft, NT, ND. Dressings c/d/i x4   : Voiding     Labs:  05-07  x     / x     /0.77   05-07  11.11 / 30.9  /x                              9.6    11.11 )-----------( 251      ( 07 May 2025 07:11 )             30.9     05-07    126[L]  |  93[L]  |  19  ----------------------------<  250[H]  4.6   |  23  |  0.77    Ca    8.7      07 May 2025 07:16          Urine Cx:  Culture - Urine (04.15.25 @ 14:59)   Specimen Source: Clean Catch Clean Catch (Midstream)  Culture Results:   No growth

## 2025-05-08 NOTE — DISCHARGE NOTE NURSING/CASE MANAGEMENT/SOCIAL WORK - PATIENT PORTAL LINK FT
You can access the FollowMyHealth Patient Portal offered by [facility] by registering at the following website: [email or web address]. By joining [facility]’s FollowMyHealth portal, you will also be able to view your health information using other applications (apps) compatible with our system.

## 2025-05-08 NOTE — PROGRESS NOTE ADULT - ASSESSMENT
74M s/p R nephrectomy 5/6    -marcos removed 5/7, pt passed TOV   -ambulation as permitted  -SCDs for DVT ppx  -hydration  -tolerating RD  -pain control  -dispo home today if reaches discharge milestones     74M s/p R nephrectomy 5/6    -marcos removed 5/7, pt passed TOV   -ambulation as permitted  -SCDs for DVT ppx  -patient to restart eliquis at home friday 5/9  -hydration  -tolerating RD  -pain control  -dispo home today

## 2025-05-08 NOTE — PROGRESS NOTE ADULT - SUBJECTIVE AND OBJECTIVE BOX
Urology Progress Note      Subjective:    no new gu complaints        Physical Exam:    no distress  incisions clean dry intact

## 2025-05-14 LAB — SURGICAL PATHOLOGY STUDY: SIGNIFICANT CHANGE UP

## (undated) DEVICE — PACK CYSTO

## (undated) DEVICE — POSITIONER FOAM HEADREST (PINK)

## (undated) DEVICE — WARMING BLANKET LOWER ADULT

## (undated) DEVICE — ENDOCATCH II 15MM

## (undated) DEVICE — SPECIMEN CONTAINER 100ML

## (undated) DEVICE — TROCAR COVIDIEN VERSAPORT BLADELESS OPTICAL 15MM STANDARD

## (undated) DEVICE — STAPLER COVIDIEN ENDO GIA STANDARD HANDLE

## (undated) DEVICE — SUT POLYSORB 0 36" GU-46

## (undated) DEVICE — SHEARS COVIDIEN ENDO SHEAR 5MM X 31CM W UNIPOLAR CAUTERY

## (undated) DEVICE — SUT CLIP LAPRA-TY ABSORBABLE SIZE 0.118 TO 0.12" VIOLET

## (undated) DEVICE — DRAPE BACK TABLE COVER HEAVY DUTY 60"

## (undated) DEVICE — SUT VLOC 90 3-0 6" CV-23 UNDYED

## (undated) DEVICE — XI ARM NEEDLE DRIVER SUTURECUT MEGA 8MM

## (undated) DEVICE — TAPE SILK 3"

## (undated) DEVICE — TROCAR SURGIQUEST AIRSEAL 12MM X 100MM

## (undated) DEVICE — DRAPE 3/4 SHEET W REINFORCEMENT 56X77"

## (undated) DEVICE — MARKING PEN W RULER

## (undated) DEVICE — GLV 7.5 PROTEXIS (WHITE)

## (undated) DEVICE — XI STAPLER SUREFORM 45

## (undated) DEVICE — ELCTR BOVIE PENCIL SMOKE EVACUATION

## (undated) DEVICE — DRAPE MAYO STAND 30"

## (undated) DEVICE — SOL IRR POUR H2O 250ML

## (undated) DEVICE — TUBING THERMADX UROLOGY

## (undated) DEVICE — ELECTRO LUBE ANTI-STICK SOLUTION FOR CAUTERY TIP

## (undated) DEVICE — LAP PAD 4 X 18"

## (undated) DEVICE — TROCAR COVIDIEN VERSASTEP PLUS 15MM STANDARD

## (undated) DEVICE — GOWN TRIMAX LG

## (undated) DEVICE — INSUFFLATION NDL COVIDIEN STEP 14G FOR STEP/VERSASTEP

## (undated) DEVICE — PREP BETADINE KIT

## (undated) DEVICE — SUT POLYSORB 0 60" TIES UNDYED

## (undated) DEVICE — SUT POLYSORB 1-0 27" GS-21 UNDYED

## (undated) DEVICE — DRAPE LIGHT HANDLE COVER (BLUE)

## (undated) DEVICE — D HELP - CLEARVIEW CLEARIFY SYSTEM

## (undated) DEVICE — SUT POLYSORB 2-0 30" V-20 UNDYED

## (undated) DEVICE — SUT CAPROSYN 4-0 P-12 UNDYED

## (undated) DEVICE — WARMING BLANKET UPPER ADULT

## (undated) DEVICE — DRAPE LINGEMAN TUR

## (undated) DEVICE — XI OBTURATOR OPTICAL BLADELESS 8MM

## (undated) DEVICE — LUBRICATING JELLY ONESHOT 1.25OZ

## (undated) DEVICE — DRSG STERISTRIPS 0.5 X 4"

## (undated) DEVICE — SOL IRR BAG NS 0.9% 3000ML

## (undated) DEVICE — DRAPE 1/2 SHEET 40X57"

## (undated) DEVICE — TROCAR COVIDIEN VERSASTEP PLUS 12MM STANDARD

## (undated) DEVICE — STAPLER SKIN VISI-STAT 35 WIDE

## (undated) DEVICE — XI DRAPE COLUMN

## (undated) DEVICE — KIT M-CLOSE TARGETED ANESTHESIA AND FASCIAL CLOSURE

## (undated) DEVICE — DRAIN JACKSON PRATT 10MM FLAT FULL NO TROCAR

## (undated) DEVICE — SOL IRR BAG H2O 3000ML

## (undated) DEVICE — TUBING STRYKEFLOW II SUCTION / IRRIGATOR

## (undated) DEVICE — ACMI SELF-SEALING SEAL UP TO 7FR

## (undated) DEVICE — XI ENDOWRIST SUCTION IRRIGATOR 8MM

## (undated) DEVICE — XI VESSEL SEALER

## (undated) DEVICE — FOLEY TRAY 16FR 5CC LTX UMETER CLOSED

## (undated) DEVICE — APPLICATOR VISTASEAL LAP DUAL 35CM RIGID

## (undated) DEVICE — POSITIONER FOAM EGG CRATE ULNAR 2PCS (PINK)

## (undated) DEVICE — TUBING AIRSEAL TRI-LUMEN FILTERED

## (undated) DEVICE — DRAPE INSTRUMENT POUCH 6.75" X 11"

## (undated) DEVICE — PACK ADVANCED LAPAROSCOPIC NS

## (undated) DEVICE — XI CANNULA SEAL 5MM - 8 MM

## (undated) DEVICE — XI DRAPE ARM

## (undated) DEVICE — ENDOCATCH 10MM

## (undated) DEVICE — MEDICATION LABELS W MARKER

## (undated) DEVICE — DRAIN RESERVOIR FOR JACKSON PRATT 100CC CARDINAL

## (undated) DEVICE — SUT VLOC 180 2-0 9" GS-21 GREEN

## (undated) DEVICE — SYR LUER LOK 10CC

## (undated) DEVICE — NDL COUNTER FOAM AND MAGNET 40-70

## (undated) DEVICE — DRSG TEGADERM 2.5 X 3"

## (undated) DEVICE — XI SCISSOR TIP COVER

## (undated) DEVICE — ELCTR LAPROSCOPIC FLEXIBLE ARGON COAG ONLY 28CM

## (undated) DEVICE — DRAPE TOWEL BLUE 17" X 24"

## (undated) DEVICE — VENODYNE/SCD SLEEVE CALF MEDIUM

## (undated) DEVICE — HANDSET ARGON